# Patient Record
Sex: MALE | Race: OTHER | Employment: OTHER | URBAN - METROPOLITAN AREA
[De-identification: names, ages, dates, MRNs, and addresses within clinical notes are randomized per-mention and may not be internally consistent; named-entity substitution may affect disease eponyms.]

---

## 2023-05-09 DIAGNOSIS — E03.9 HYPOTHYROIDISM, UNSPECIFIED: ICD-10-CM

## 2023-05-09 DIAGNOSIS — E11.69 TYPE 2 DIABETES MELLITUS WITH OTHER SPECIFIED COMPLICATION (MULTI): ICD-10-CM

## 2023-05-09 DIAGNOSIS — I25.810 ATHEROSCLEROSIS OF CORONARY ARTERY BYPASS GRAFT(S) WITHOUT ANGINA PECTORIS: ICD-10-CM

## 2023-05-09 DIAGNOSIS — E78.2 MIXED HYPERLIPIDEMIA: ICD-10-CM

## 2023-05-10 DIAGNOSIS — E11.42 CONTROLLED TYPE 2 DIABETES MELLITUS WITH DIABETIC POLYNEUROPATHY, WITH LONG-TERM CURRENT USE OF INSULIN (MULTI): Primary | ICD-10-CM

## 2023-05-10 DIAGNOSIS — Z79.4 CONTROLLED TYPE 2 DIABETES MELLITUS WITH DIABETIC POLYNEUROPATHY, WITH LONG-TERM CURRENT USE OF INSULIN (MULTI): Primary | ICD-10-CM

## 2023-05-10 RX ORDER — LEVOTHYROXINE SODIUM 50 UG/1
TABLET ORAL
Qty: 90 TABLET | Refills: 2 | Status: SHIPPED | OUTPATIENT
Start: 2023-05-10 | End: 2024-02-19

## 2023-05-10 RX ORDER — SIMVASTATIN 20 MG/1
TABLET, FILM COATED ORAL
Qty: 90 TABLET | Refills: 2 | Status: SHIPPED | OUTPATIENT
Start: 2023-05-10 | End: 2024-01-15

## 2023-05-10 RX ORDER — CLOPIDOGREL BISULFATE 75 MG/1
TABLET ORAL
Qty: 90 TABLET | Refills: 2 | Status: SHIPPED | OUTPATIENT
Start: 2023-05-10 | End: 2024-02-19

## 2023-05-10 RX ORDER — INSULIN LISPRO 100 [IU]/ML
INJECTION, SOLUTION INTRAVENOUS; SUBCUTANEOUS
COMMUNITY
End: 2023-05-10 | Stop reason: SDUPTHER

## 2023-05-10 RX ORDER — METOPROLOL SUCCINATE 50 MG/1
TABLET, EXTENDED RELEASE ORAL
Qty: 90 TABLET | Refills: 2 | Status: SHIPPED | OUTPATIENT
Start: 2023-05-10 | End: 2024-02-19

## 2023-05-11 RX ORDER — INSULIN LISPRO 100 [IU]/ML
INJECTION, SOLUTION INTRAVENOUS; SUBCUTANEOUS
Qty: 1 EACH | Refills: 1 | Status: SHIPPED | OUTPATIENT
Start: 2023-05-11 | End: 2023-11-07 | Stop reason: SDUPTHER

## 2023-07-07 ENCOUNTER — OFFICE VISIT (OUTPATIENT)
Dept: PRIMARY CARE | Facility: CLINIC | Age: 63
End: 2023-07-07
Payer: MEDICAID

## 2023-07-07 VITALS
WEIGHT: 203 LBS | BODY MASS INDEX: 29.06 KG/M2 | DIASTOLIC BLOOD PRESSURE: 80 MMHG | HEIGHT: 70 IN | TEMPERATURE: 97 F | OXYGEN SATURATION: 99 % | SYSTOLIC BLOOD PRESSURE: 162 MMHG | HEART RATE: 65 BPM

## 2023-07-07 DIAGNOSIS — E11.69 COMBINED HYPERLIPIDEMIA ASSOCIATED WITH TYPE 2 DIABETES MELLITUS (MULTI): ICD-10-CM

## 2023-07-07 DIAGNOSIS — E78.2 COMBINED HYPERLIPIDEMIA ASSOCIATED WITH TYPE 2 DIABETES MELLITUS (MULTI): ICD-10-CM

## 2023-07-07 DIAGNOSIS — E03.9 HYPOTHYROIDISM, UNSPECIFIED TYPE: ICD-10-CM

## 2023-07-07 DIAGNOSIS — I15.2 HYPERTENSION ASSOCIATED WITH DIABETES (MULTI): ICD-10-CM

## 2023-07-07 DIAGNOSIS — E11.3299 MILD NONPROLIFERATIVE DIABETIC RETINOPATHY WITHOUT MACULAR EDEMA ASSOCIATED WITH TYPE 2 DIABETES MELLITUS, UNSPECIFIED LATERALITY (MULTI): Primary | ICD-10-CM

## 2023-07-07 DIAGNOSIS — Z12.11 ENCOUNTER FOR SCREENING FOR MALIGNANT NEOPLASM OF COLON: ICD-10-CM

## 2023-07-07 DIAGNOSIS — E11.59 HYPERTENSION ASSOCIATED WITH DIABETES (MULTI): ICD-10-CM

## 2023-07-07 DIAGNOSIS — E11.42 DIABETIC POLYNEUROPATHY ASSOCIATED WITH TYPE 2 DIABETES MELLITUS (MULTI): ICD-10-CM

## 2023-07-07 PROBLEM — E11.319 DIABETIC RETINOPATHY (MULTI): Status: ACTIVE | Noted: 2023-07-07

## 2023-07-07 PROBLEM — I25.810 CORONARY ARTERY DISEASE INVOLVING CORONARY BYPASS GRAFT OF NATIVE HEART: Status: ACTIVE | Noted: 2023-07-07

## 2023-07-07 PROBLEM — E11.40 DIABETIC NEUROPATHY (MULTI): Status: ACTIVE | Noted: 2023-07-07

## 2023-07-07 PROBLEM — E55.9 VITAMIN D DEFICIENCY: Status: ACTIVE | Noted: 2023-07-07

## 2023-07-07 PROBLEM — N18.31 STAGE 3A CHRONIC KIDNEY DISEASE (MULTI): Status: ACTIVE | Noted: 2023-07-07

## 2023-07-07 LAB — POC HEMOGLOBIN A1C: 6.7 % (ref 4.2–6.5)

## 2023-07-07 PROCEDURE — 3079F DIAST BP 80-89 MM HG: CPT | Performed by: FAMILY MEDICINE

## 2023-07-07 PROCEDURE — 83036 HEMOGLOBIN GLYCOSYLATED A1C: CPT | Performed by: FAMILY MEDICINE

## 2023-07-07 PROCEDURE — 99214 OFFICE O/P EST MOD 30 MIN: CPT | Performed by: FAMILY MEDICINE

## 2023-07-07 PROCEDURE — 1036F TOBACCO NON-USER: CPT | Performed by: FAMILY MEDICINE

## 2023-07-07 PROCEDURE — 3077F SYST BP >= 140 MM HG: CPT | Performed by: FAMILY MEDICINE

## 2023-07-07 RX ORDER — FENOFIBRATE 160 MG/1
80 TABLET ORAL DAILY
COMMUNITY
End: 2023-08-29

## 2023-07-07 RX ORDER — ASPIRIN 81 MG/1
TABLET ORAL
COMMUNITY
Start: 2011-01-21

## 2023-07-07 RX ORDER — ERGOCALCIFEROL 1.25 MG/1
50000 CAPSULE ORAL WEEKLY
COMMUNITY
End: 2023-11-07 | Stop reason: ALTCHOICE

## 2023-07-07 RX ORDER — INSULIN GLARGINE 300 U/ML
INJECTION, SOLUTION SUBCUTANEOUS
COMMUNITY
Start: 2019-02-20 | End: 2023-09-01

## 2023-07-07 ASSESSMENT — LIFESTYLE VARIABLES
HOW OFTEN DO YOU HAVE A DRINK CONTAINING ALCOHOL: MONTHLY OR LESS
AUDIT-C TOTAL SCORE: 1
HOW MANY STANDARD DRINKS CONTAINING ALCOHOL DO YOU HAVE ON A TYPICAL DAY: PATIENT DOES NOT DRINK
SKIP TO QUESTIONS 9-10: 1
HOW OFTEN DO YOU HAVE SIX OR MORE DRINKS ON ONE OCCASION: NEVER

## 2023-07-07 NOTE — PROGRESS NOTES
Subjective   Patient ID: Charito Rojas is a 62 y.o. male who presents for Medicare Annual Wellness Visit Subsequent.    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 (CMS/HCC)    Diabetic neuropathy (CMS/HCC)    Relevant Orders    POCT glycosylated hemoglobin (Hb A1C) manually resulted    Diabetic retinopathy (CMS/HCC) - Primary    Hypertension associated with diabetes (CMS/[facility])    Hypothyroidism     Other Visit Diagnoses       Encounter for screening for malignant neoplasm of colon        Relevant Orders    Cologuard® colon cancer screening        Last wellness in October 2022   Prevnar 20 on previous study  Continue follow-up with podiatry  Continue to follow-up with ophthalmology    Advised to cut down on insulin previous visit  Diet exercise  cologuard negative June 2020 and had a colonoscopy 4 years ago  Following with podiatry  Received Pneumovax  Discussed about diet exercise  Following up with ophthalmology with diabetic retinopathy every 6 months  Consider gabapentin for diabetic neuropathy patient stopped  Follow-up in 6 months     HPI  62-year-old male here for follow-up on    Globin A1c was 6.4 on previous visit 6.7 today      Hypertension.   Hyperlipidemia.  Uncontrolled diabetes with peripheral neuropathy fairly controlled without medication diabetic retinopathy  Hypothyroidism  Obesity.   Coronary artery disease, three-vessel bypass surgery February 2011 without active symptoms. Maintained on appropriate medications and dual antiplatelet agents    bilateral feet tingling numbness pins and needles secondary to diabetic neuropathy  No hypoglycemia no new signs and symptoms  Patient received flu vaccine       Discussed about getting shingles vaccine  Walking 5 miles almost every day not walking that much secondary to ulcer in the leg which is healing well was seen by podiatry    No Known Allergies    Current Outpatient  "Medications   Medication Sig Dispense Refill    aspirin 81 mg EC tablet 1 tab(s), ORAL, DAILY, 90 tab(s), Tablet EC      clopidogrel (Plavix) 75 mg tablet TAKE 1 TABLET BY MOUTH EVERY DAY 90 tablet 2    ergocalciferol (Vitamin D-2) 1.25 MG (12936 UT) capsule Take 1 capsule (50,000 Units) by mouth once a week.      fenofibrate (Triglide) 160 mg tablet Take 0.5 tablets (80 mg) by mouth once daily.      insulin lispro (HumaLOG) 100 unit/mL injection INJECT 14 UNITS SUBQUTANIOUSLY 3 TIMES DAILY 1 each 1    levothyroxine (Synthroid, Levoxyl) 50 mcg tablet TAKE 1 TABLET BY MOUTH EVERY DAY 90 tablet 2    metoprolol succinate XL (Toprol-XL) 50 mg 24 hr tablet TAKE 1 TABLET BY MOUTH EVERY DAY 90 tablet 2    simvastatin (Zocor) 20 mg tablet TAKE 1 TABLET BY MOUTH EVERY DAY 90 tablet 2    Toujeo SoloStar U-300 Insulin 300 unit/mL (1.5 mL) injection Inject under the skin.       No current facility-administered medications for this visit.       Objective   Visit Vitals  /80 (BP Location: Left arm, Patient Position: Sitting)   Pulse 65   Temp 36.1 °C (97 °F)   Ht 1.778 m (5' 10\")   Wt 92.1 kg (203 lb)   SpO2 99%   BMI 29.13 kg/m²   Smoking Status Never   BSA 2.13 m²     B/l le foot numbness chronic   Pilse intact  Left toes deformity  Skin normal  Constitutional:       General: He is not in acute distress.     Appearance: Normal appearance.   HENT:      Head: Normocephalic and atraumatic.      Nose: Nose normal.   Eyes:      Extraocular Movements: Extraocular movements intact.      Conjunctiva/sclera: Conjunctivae normal.   Cardiovascular:      Rate and Rhythm: Normal rate and regular rhythm.   Pulmonary:      Effort: Pulmonary effort is normal.      Breath sounds: Normal breath sounds.   Skin:     General: Skin is warm.   Neurological:      Mental Status: He is alert and oriented to person, place, and time.   Psychiatric:         Mood and Affect: Mood normal.         Behavior: Behavior normal.     There is no immunization " history on file for this patient.    Review of Systems    No visits with results within 4 Month(s) from this visit.   Latest known visit with results is:   Legacy Encounter on 10/10/2022   Component Date Value Ref Range Status    Prostate Specific Antigen,Screen 10/10/2022 0.58  0.00 - 4.00 ng/mL Final    Cholesterol 10/10/2022 128  0 - 199 mg/dL Final    HDL 10/10/2022 37.6 (A)  mg/dL Final    Cholesterol/HDL Ratio 10/10/2022 3.4   Final    LDL 10/10/2022 75  0 - 99 mg/dL Final    VLDL 10/10/2022 15  0 - 40 mg/dL Final    Triglycerides 10/10/2022 75  0 - 149 mg/dL Final    Glucose 10/10/2022 136 (H)  74 - 99 mg/dL Final    Sodium 10/10/2022 139  136 - 145 mmol/L Final    Potassium 10/10/2022 4.5  3.5 - 5.3 mmol/L Final    Chloride 10/10/2022 103  98 - 107 mmol/L Final    Bicarbonate 10/10/2022 28  21 - 32 mmol/L Final    Anion Gap 10/10/2022 13  10 - 20 mmol/L Final    Urea Nitrogen 10/10/2022 19  6 - 23 mg/dL Final    Creatinine 10/10/2022 1.09  0.50 - 1.30 mg/dL Final    GFR MALE 10/10/2022 77  >90 mL/min/1.73m2 Final    Calcium 10/10/2022 10.0  8.6 - 10.6 mg/dL Final    Albumin 10/10/2022 4.2  3.4 - 5.0 g/dL Final    Alkaline Phosphatase 10/10/2022 40  33 - 136 U/L Final    Total Protein 10/10/2022 7.3  6.4 - 8.2 g/dL Final    AST 10/10/2022 16  9 - 39 U/L Final    Total Bilirubin 10/10/2022 0.4  0.0 - 1.2 mg/dL Final    ALT (SGPT) 10/10/2022 16  10 - 52 U/L Final    Hemoglobin A1C 10/10/2022 6.5 (A)  % Final    Estimated Average Glucose 10/10/2022 140  MG/DL Final    TSH 10/10/2022 3.84  0.44 - 3.98 mIU/L Final    WBC 10/10/2022 4.8  4.4 - 11.3 x10E9/L Final    nRBC 10/10/2022 0.0  0.0 - 0.0 /100 WBC Final    RBC 10/10/2022 4.56  4.50 - 5.90 x10E12/L Final    Hemoglobin 10/10/2022 13.2 (L)  13.5 - 17.5 g/dL Final    Hematocrit 10/10/2022 38.6 (L)  41.0 - 52.0 % Final    MCV 10/10/2022 85  80 - 100 fL Final    MCHC 10/10/2022 34.2  32.0 - 36.0 g/dL Final    Platelets 10/10/2022 203  150 - 450 x10E9/L Final     RDW 10/10/2022 12.6  11.5 - 14.5 % Final    ALBUMIN (MG/L) IN URINE 10/10/2022 11.3  Not Established mg/L Final    Albumin/Creatine Ratio 10/10/2022 13.6  0.0 - 30.0 ug/mg crt Final    Creatinine, Urine 10/10/2022 83.1  20.0 - 370.0 mg/dL Final    Color, Urine 10/10/2022 YELLOW  STRAW,YELLOW Final    Appearance, Urine 10/10/2022 CLEAR  CLEAR Final    Specific Gravity, Urine 10/10/2022 1.015  1.005 - 1.035 Final    pH, Urine 10/10/2022 7.0  5.0 - 8.0 Final    Protein, Urine 10/10/2022 NEGATIVE  NEGATIVE mg/dL Final    Glucose, Urine 10/10/2022 NEGATIVE  NEGATIVE mg/dL Final    Blood, Urine 10/10/2022 NEGATIVE  NEGATIVE Final    Ketones, Urine 10/10/2022 NEGATIVE  NEGATIVE mg/dL Final    Bilirubin, Urine 10/10/2022 NEGATIVE  NEGATIVE Final    Urobilinogen, Urine 10/10/2022 <2.0  0.0 - 1.9 mg/dL Final    Nitrite, Urine 10/10/2022 NEGATIVE  NEGATIVE Final    Leukocyte Esterase, Urine 10/10/2022 NEGATIVE  NEGATIVE Final    Vitamin B-12 10/10/2022 307  211 - 911 pg/mL Final       Radiology: Reviewed imaging in powerchart.  No results found.    No family history on file.  Social History     Socioeconomic History    Marital status: Unknown     Spouse name: None    Number of children: None    Years of education: None    Highest education level: None   Occupational History    None   Tobacco Use    Smoking status: Never    Smokeless tobacco: Never   Substance and Sexual Activity    Alcohol use: Never    Drug use: Never    Sexual activity: None   Other Topics Concern    None   Social History Narrative    None     Social Determinants of Health     Financial Resource Strain: Not on file   Food Insecurity: Not on file   Transportation Needs: Not on file   Physical Activity: Not on file   Stress: Not on file   Social Connections: Not on file   Intimate Partner Violence: Not on file   Housing Stability: Not on file     Past Medical History:   Diagnosis Date    Atherosclerosis of coronary artery bypass graft(s) without angina  pectoris 06/16/2018    Coronary artery disease involving coronary bypass graft of native heart    Other specified postprocedural states 07/09/2017    History of colonoscopy    Personal history of other diseases of the circulatory system 01/16/2018    History of cardiac disorder    Radiculopathy, lumbar region 02/11/2016    Chronic radicular low back pain     Past Surgical History:   Procedure Laterality Date    MOUTH SURGERY  01/08/2015    Oral Surgery Tooth Extraction    OTHER SURGICAL HISTORY  02/20/2019    Cataract surgery    OTHER SURGICAL HISTORY  09/18/2019    Eye surgery       Charting was completed using voice recognition technology and may include unintended errors.

## 2023-07-29 LAB — NONINV COLON CA DNA+OCC BLD SCRN STL QL: NEGATIVE

## 2023-08-25 DIAGNOSIS — E78.2 MIXED HYPERLIPIDEMIA: ICD-10-CM

## 2023-08-25 DIAGNOSIS — E11.69 TYPE 2 DIABETES MELLITUS WITH OTHER SPECIFIED COMPLICATION (MULTI): ICD-10-CM

## 2023-08-29 DIAGNOSIS — E11.59 HYPERTENSION ASSOCIATED WITH DIABETES (MULTI): ICD-10-CM

## 2023-08-29 DIAGNOSIS — I15.2 HYPERTENSION ASSOCIATED WITH DIABETES (MULTI): ICD-10-CM

## 2023-08-29 RX ORDER — FENOFIBRATE 160 MG/1
80 TABLET ORAL DAILY
Qty: 45 TABLET | Refills: 2 | Status: SHIPPED | OUTPATIENT
Start: 2023-08-29 | End: 2024-05-17 | Stop reason: SDUPTHER

## 2023-09-01 RX ORDER — INSULIN GLARGINE 300 U/ML
INJECTION, SOLUTION SUBCUTANEOUS
Qty: 4.5 ML | Refills: 3 | Status: SHIPPED | OUTPATIENT
Start: 2023-09-01 | End: 2023-11-07 | Stop reason: SDUPTHER

## 2023-11-07 ENCOUNTER — LAB (OUTPATIENT)
Dept: LAB | Facility: LAB | Age: 63
End: 2023-11-07
Payer: COMMERCIAL

## 2023-11-07 ENCOUNTER — OFFICE VISIT (OUTPATIENT)
Dept: PRIMARY CARE | Facility: CLINIC | Age: 63
End: 2023-11-07
Payer: COMMERCIAL

## 2023-11-07 VITALS
DIASTOLIC BLOOD PRESSURE: 78 MMHG | SYSTOLIC BLOOD PRESSURE: 130 MMHG | BODY MASS INDEX: 27.92 KG/M2 | WEIGHT: 195 LBS | TEMPERATURE: 96.8 F | HEART RATE: 60 BPM | HEIGHT: 70 IN | OXYGEN SATURATION: 98 %

## 2023-11-07 DIAGNOSIS — Z00.00 VISIT FOR PREVENTIVE HEALTH EXAMINATION: ICD-10-CM

## 2023-11-07 DIAGNOSIS — E11.59 HYPERTENSION ASSOCIATED WITH DIABETES (MULTI): ICD-10-CM

## 2023-11-07 DIAGNOSIS — Z12.5 ENCOUNTER FOR SCREENING FOR MALIGNANT NEOPLASM OF PROSTATE: ICD-10-CM

## 2023-11-07 DIAGNOSIS — E11.42 CONTROLLED TYPE 2 DIABETES MELLITUS WITH DIABETIC POLYNEUROPATHY, WITH LONG-TERM CURRENT USE OF INSULIN (MULTI): ICD-10-CM

## 2023-11-07 DIAGNOSIS — E11.69 COMBINED HYPERLIPIDEMIA ASSOCIATED WITH TYPE 2 DIABETES MELLITUS (MULTI): ICD-10-CM

## 2023-11-07 DIAGNOSIS — Z79.4 CONTROLLED TYPE 2 DIABETES MELLITUS WITH DIABETIC POLYNEUROPATHY, WITH LONG-TERM CURRENT USE OF INSULIN (MULTI): ICD-10-CM

## 2023-11-07 DIAGNOSIS — I15.2 HYPERTENSION ASSOCIATED WITH DIABETES (MULTI): ICD-10-CM

## 2023-11-07 DIAGNOSIS — N18.31 STAGE 3A CHRONIC KIDNEY DISEASE (MULTI): ICD-10-CM

## 2023-11-07 DIAGNOSIS — E11.3299 MILD NONPROLIFERATIVE DIABETIC RETINOPATHY WITHOUT MACULAR EDEMA ASSOCIATED WITH TYPE 2 DIABETES MELLITUS, UNSPECIFIED LATERALITY (MULTI): ICD-10-CM

## 2023-11-07 DIAGNOSIS — Z00.00 VISIT FOR PREVENTIVE HEALTH EXAMINATION: Primary | ICD-10-CM

## 2023-11-07 DIAGNOSIS — E78.2 COMBINED HYPERLIPIDEMIA ASSOCIATED WITH TYPE 2 DIABETES MELLITUS (MULTI): ICD-10-CM

## 2023-11-07 LAB — POC HEMOGLOBIN A1C: 6 % (ref 4.2–6.5)

## 2023-11-07 PROCEDURE — 80061 LIPID PANEL: CPT

## 2023-11-07 PROCEDURE — 82570 ASSAY OF URINE CREATININE: CPT

## 2023-11-07 PROCEDURE — 3075F SYST BP GE 130 - 139MM HG: CPT | Performed by: FAMILY MEDICINE

## 2023-11-07 PROCEDURE — 83036 HEMOGLOBIN GLYCOSYLATED A1C: CPT | Performed by: FAMILY MEDICINE

## 2023-11-07 PROCEDURE — 82043 UR ALBUMIN QUANTITATIVE: CPT

## 2023-11-07 PROCEDURE — 1036F TOBACCO NON-USER: CPT | Performed by: FAMILY MEDICINE

## 2023-11-07 PROCEDURE — 84443 ASSAY THYROID STIM HORMONE: CPT

## 2023-11-07 PROCEDURE — 84153 ASSAY OF PSA TOTAL: CPT

## 2023-11-07 PROCEDURE — 99214 OFFICE O/P EST MOD 30 MIN: CPT | Performed by: FAMILY MEDICINE

## 2023-11-07 PROCEDURE — 80053 COMPREHEN METABOLIC PANEL: CPT

## 2023-11-07 PROCEDURE — 85027 COMPLETE CBC AUTOMATED: CPT

## 2023-11-07 PROCEDURE — 3078F DIAST BP <80 MM HG: CPT | Performed by: FAMILY MEDICINE

## 2023-11-07 PROCEDURE — 36415 COLL VENOUS BLD VENIPUNCTURE: CPT

## 2023-11-07 PROCEDURE — 81001 URINALYSIS AUTO W/SCOPE: CPT

## 2023-11-07 RX ORDER — INSULIN GLARGINE 300 U/ML
15 INJECTION, SOLUTION SUBCUTANEOUS NIGHTLY
Qty: 4.5 ML | Refills: 3 | Status: SHIPPED | OUTPATIENT
Start: 2023-11-07

## 2023-11-07 RX ORDER — INSULIN LISPRO 100 [IU]/ML
INJECTION, SOLUTION INTRAVENOUS; SUBCUTANEOUS
Qty: 1 EACH | Refills: 1 | Status: SHIPPED | OUTPATIENT
Start: 2023-11-07 | End: 2024-02-06

## 2023-11-07 NOTE — PROGRESS NOTES
Subjective   Patient ID: Charito Rojas is a 63 y.o. male who presents for follow-up on chronic medical condition.    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 (CMS/HCC)    Diabetic retinopathy (CMS/HCC)    Hypertension associated with diabetes (CMS/HCC)    Relevant Medications    insulin glargine (Toujeo SoloStar U-300 Insulin) 300 unit/mL (1.5 mL) injection    Other Relevant Orders    TSH with reflex to Free T4 if abnormal    Lipid Panel    Comprehensive Metabolic Panel    CBC    Urinalysis with Reflex Microscopic    Albumin , Urine Random    Stage 3a chronic kidney disease (CMS/HCC)     Other Visit Diagnoses       Visit for preventive health examination    -  Primary    Relevant Orders    POCT glycosylated hemoglobin (Hb A1C) manually resulted    Prostate Specific Antigen, Screen    TSH with reflex to Free T4 if abnormal    Lipid Panel    Comprehensive Metabolic Panel    CBC    Urinalysis with Reflex Microscopic    Albumin , Urine Random    Controlled type 2 diabetes mellitus with diabetic polyneuropathy, with long-term current use of insulin (CMS/[facility])        Relevant Medications    insulin glargine (Toujeo SoloStar U-300 Insulin) 300 unit/mL (1.5 mL) injection    insulin lispro (HumaLOG) 100 unit/mL injection    Other Relevant Orders    POCT glycosylated hemoglobin (Hb A1C) manually resulted    TSH with reflex to Free T4 if abnormal    Lipid Panel    Comprehensive Metabolic Panel    CBC    Urinalysis with Reflex Microscopic    Albumin , Urine Random    Encounter for screening for malignant neoplasm of prostate        Relevant Orders    TSH with reflex to Free T4 if abnormal          Prevnar 20 on previous study  Continue follow-up with podiatry  Continue to follow-up with ophthalmology    Received flu vaccine    Diet exercise  cologuard negative June 2020 and had a colonoscopy 4 years ago  Cologuard negative in July  2023  Following with podiatry  Received Pneumovax  Discussed about diet exercise  Following up with ophthalmology with diabetic retinopathy every 6 months  Consider gabapentin for diabetic neuropathy patient stopped  Follow-up in 6 months     Wellness on next appointment    HPI  63-year-old male here for follow-up on    HbA1c was 6.4 --> 6.7-->6.0      Hypertension.   Hyperlipidemia.  Uncontrolled diabetes with peripheral neuropathy fairly controlled without medication diabetic retinopathy  Hypothyroidism  Obesity.   Coronary artery disease, three-vessel bypass surgery February 2011 without active symptoms. Maintained on appropriate medications and dual antiplatelet agents    bilateral feet tingling numbness pins and needles secondary to diabetic neuropathy  No hypoglycemia no new signs and symptoms  Patient received flu vaccine       Discussed about getting shingles vaccine  Walking 5 miles almost every day not walking that much secondary to ulcer in the leg which is healing well was seen by podiatry    Allergies   Allergen Reactions    Simvastatin Other and Unknown     lft changes       Current Outpatient Medications   Medication Sig Dispense Refill    aspirin 81 mg EC tablet 1 tab(s), ORAL, DAILY, 90 tab(s), Tablet EC      clopidogrel (Plavix) 75 mg tablet TAKE 1 TABLET BY MOUTH EVERY DAY 90 tablet 2    fenofibrate (Triglide) 160 mg tablet Take 0.5 tablets (80 mg) by mouth once daily. 45 tablet 2    levothyroxine (Synthroid, Levoxyl) 50 mcg tablet TAKE 1 TABLET BY MOUTH EVERY DAY 90 tablet 2    metoprolol succinate XL (Toprol-XL) 50 mg 24 hr tablet TAKE 1 TABLET BY MOUTH EVERY DAY 90 tablet 2    simvastatin (Zocor) 20 mg tablet TAKE 1 TABLET BY MOUTH EVERY DAY 90 tablet 2    insulin glargine (Toujeo SoloStar U-300 Insulin) 300 unit/mL (1.5 mL) injection Inject 15 Units under the skin once daily at bedtime. Take as directed per insulin instructions. 4.5 mL 3    insulin lispro (HumaLOG) 100 unit/mL injection INJECT  "14 UNITS SUBQUTANIOUSLY 3 TIMES DAILY 1 each 1     No current facility-administered medications for this visit.       Objective   Visit Vitals  /78 (BP Location: Left arm, Patient Position: Sitting)   Pulse 60   Temp 36 °C (96.8 °F)   Ht 1.778 m (5' 10\")   Wt 88.5 kg (195 lb)   SpO2 98%   BMI 27.98 kg/m²   Smoking Status Never   BSA 2.09 m²     B/l le foot numbness chronic   Pilse intact  Left toes deformity  Skin normal  Constitutional:       General: He is not in acute distress.     Appearance: Normal appearance.   HENT:      Head: Normocephalic and atraumatic.      Nose: Nose normal.   Eyes:      Extraocular Movements: Extraocular movements intact.      Conjunctiva/sclera: Conjunctivae normal.   Cardiovascular:      Rate and Rhythm: Normal rate and regular rhythm.   Pulmonary:      Effort: Pulmonary effort is normal.      Breath sounds: Normal breath sounds.   Skin:     General: Skin is warm.   Neurological:      Mental Status: He is alert and oriented to person, place, and time.   Psychiatric:         Mood and Affect: Mood normal.         Behavior: Behavior normal.   Immunization History   Administered Date(s) Administered    Influenza, injectable, MDCK, preservative free, quadrivalent 09/07/2017    Influenza, seasonal, injectable, preservative free 09/01/2014       Review of Systems    No visits with results within 4 Month(s) from this visit.   Latest known visit with results is:   Office Visit on 07/07/2023   Component Date Value Ref Range Status    POC HEMOGLOBIN A1c 07/07/2023 6.7 (A)  4.2 - 6.5 % Final    NONINV COLON CA DNA+OCC BLD SCRN S* 07/22/2023 Negative  Negative Final       Radiology: Reviewed imaging in powerchart.  No results found.    No family history on file.  Social History     Socioeconomic History    Marital status: Unknown     Spouse name: None    Number of children: None    Years of education: None    Highest education level: None   Occupational History    None   Tobacco Use    " Smoking status: Never    Smokeless tobacco: Never   Substance and Sexual Activity    Alcohol use: Never    Drug use: Never    Sexual activity: None   Other Topics Concern    None   Social History Narrative    None     Social Determinants of Health     Financial Resource Strain: Not on file   Food Insecurity: Not on file   Transportation Needs: Not on file   Physical Activity: Not on file   Stress: Not on file   Social Connections: Not on file   Intimate Partner Violence: Not on file   Housing Stability: Not on file     Past Medical History:   Diagnosis Date    Atherosclerosis of coronary artery bypass graft(s) without angina pectoris 06/16/2018    Coronary artery disease involving coronary bypass graft of native heart    Other specified postprocedural states 07/09/2017    History of colonoscopy    Personal history of other diseases of the circulatory system 01/16/2018    History of cardiac disorder    Radiculopathy, lumbar region 02/11/2016    Chronic radicular low back pain     Past Surgical History:   Procedure Laterality Date    MOUTH SURGERY  01/08/2015    Oral Surgery Tooth Extraction    OTHER SURGICAL HISTORY  02/20/2019    Cataract surgery    OTHER SURGICAL HISTORY  09/18/2019    Eye surgery       Charting was completed using voice recognition technology and may include unintended errors.       Physical Exam

## 2023-11-08 ENCOUNTER — TELEPHONE (OUTPATIENT)
Dept: PRIMARY CARE | Facility: CLINIC | Age: 63
End: 2023-11-08

## 2023-11-08 ENCOUNTER — OFFICE VISIT (OUTPATIENT)
Dept: PRIMARY CARE | Facility: CLINIC | Age: 63
End: 2023-11-08
Payer: COMMERCIAL

## 2023-11-08 VITALS
HEART RATE: 60 BPM | OXYGEN SATURATION: 98 % | SYSTOLIC BLOOD PRESSURE: 166 MMHG | WEIGHT: 195 LBS | BODY MASS INDEX: 27.92 KG/M2 | HEIGHT: 70 IN | DIASTOLIC BLOOD PRESSURE: 88 MMHG

## 2023-11-08 DIAGNOSIS — D64.9 ANEMIA, UNSPECIFIED TYPE: Primary | ICD-10-CM

## 2023-11-08 DIAGNOSIS — N17.9 ACUTE RENAL FAILURE, UNSPECIFIED ACUTE RENAL FAILURE TYPE (CMS-HCC): ICD-10-CM

## 2023-11-08 DIAGNOSIS — R52 PAIN: ICD-10-CM

## 2023-11-08 LAB
ALBUMIN SERPL BCP-MCNC: 4.6 G/DL (ref 3.4–5)
ALP SERPL-CCNC: 32 U/L (ref 33–136)
ALT SERPL W P-5'-P-CCNC: 15 U/L (ref 10–52)
ANION GAP SERPL CALC-SCNC: 12 MMOL/L (ref 10–20)
APPEARANCE UR: CLEAR
AST SERPL W P-5'-P-CCNC: 20 U/L (ref 9–39)
BILIRUB SERPL-MCNC: 0.4 MG/DL (ref 0–1.2)
BILIRUB UR STRIP.AUTO-MCNC: NEGATIVE MG/DL
BUN SERPL-MCNC: 22 MG/DL (ref 6–23)
CALCIUM SERPL-MCNC: 10.1 MG/DL (ref 8.6–10.6)
CHLORIDE SERPL-SCNC: 103 MMOL/L (ref 98–107)
CHOLEST SERPL-MCNC: 112 MG/DL (ref 0–199)
CHOLESTEROL/HDL RATIO: 2.3
CO2 SERPL-SCNC: 29 MMOL/L (ref 21–32)
COLOR UR: YELLOW
CREAT SERPL-MCNC: 1.64 MG/DL (ref 0.5–1.3)
CREAT UR-MCNC: 104.7 MG/DL (ref 20–370)
ERYTHROCYTE [DISTWIDTH] IN BLOOD BY AUTOMATED COUNT: 13 % (ref 11.5–14.5)
GFR SERPL CREATININE-BSD FRML MDRD: 47 ML/MIN/1.73M*2
GLUCOSE SERPL-MCNC: 113 MG/DL (ref 74–99)
GLUCOSE UR STRIP.AUTO-MCNC: NEGATIVE MG/DL
HCT VFR BLD AUTO: 37.1 % (ref 41–52)
HDLC SERPL-MCNC: 48.2 MG/DL
HGB BLD-MCNC: 11.8 G/DL (ref 13.5–17.5)
KETONES UR STRIP.AUTO-MCNC: NEGATIVE MG/DL
LDLC SERPL CALC-MCNC: 54 MG/DL
LEUKOCYTE ESTERASE UR QL STRIP.AUTO: NEGATIVE
MCH RBC QN AUTO: 28.4 PG (ref 26–34)
MCHC RBC AUTO-ENTMCNC: 31.8 G/DL (ref 32–36)
MCV RBC AUTO: 89 FL (ref 80–100)
MICROALBUMIN UR-MCNC: 34.8 MG/L
MICROALBUMIN/CREAT UR: 33.2 UG/MG CREAT
NITRITE UR QL STRIP.AUTO: NEGATIVE
NON HDL CHOLESTEROL: 64 MG/DL (ref 0–149)
NRBC BLD-RTO: 0 /100 WBCS (ref 0–0)
PH UR STRIP.AUTO: 5 [PH]
PLATELET # BLD AUTO: 226 X10*3/UL (ref 150–450)
POTASSIUM SERPL-SCNC: 4.5 MMOL/L (ref 3.5–5.3)
PROT SERPL-MCNC: 7.2 G/DL (ref 6.4–8.2)
PROT UR STRIP.AUTO-MCNC: NEGATIVE MG/DL
PSA SERPL-MCNC: 0.77 NG/ML
RBC # BLD AUTO: 4.16 X10*6/UL (ref 4.5–5.9)
RBC # UR STRIP.AUTO: ABNORMAL /UL
RBC #/AREA URNS AUTO: ABNORMAL /HPF
SODIUM SERPL-SCNC: 139 MMOL/L (ref 136–145)
SP GR UR STRIP.AUTO: 1.01
TRIGL SERPL-MCNC: 51 MG/DL (ref 0–149)
TSH SERPL-ACNC: 2.47 MIU/L (ref 0.44–3.98)
UROBILINOGEN UR STRIP.AUTO-MCNC: <2 MG/DL
VLDL: 10 MG/DL (ref 0–40)
WBC # BLD AUTO: 5.5 X10*3/UL (ref 4.4–11.3)
WBC #/AREA URNS AUTO: ABNORMAL /HPF

## 2023-11-08 PROCEDURE — 3060F POS MICROALBUMINURIA REV: CPT | Performed by: FAMILY MEDICINE

## 2023-11-08 PROCEDURE — 3048F LDL-C <100 MG/DL: CPT | Performed by: FAMILY MEDICINE

## 2023-11-08 PROCEDURE — 1036F TOBACCO NON-USER: CPT | Performed by: FAMILY MEDICINE

## 2023-11-08 PROCEDURE — 3079F DIAST BP 80-89 MM HG: CPT | Performed by: FAMILY MEDICINE

## 2023-11-08 PROCEDURE — 99214 OFFICE O/P EST MOD 30 MIN: CPT | Performed by: FAMILY MEDICINE

## 2023-11-08 PROCEDURE — 3077F SYST BP >= 140 MM HG: CPT | Performed by: FAMILY MEDICINE

## 2023-11-08 NOTE — PROGRESS NOTES
Subjective   Patient ID: Charito Rojas is a 63 y.o. male who presents for follow-up on chronic medical condition.    Assessment/Plan     Problem List Items Addressed This Visit    None  Visit Diagnoses       Anemia, unspecified type    -  Primary    Relevant Orders    Occult Blood, Stool    Occult Blood, Stool    Occult Blood, Stool    CT abdomen pelvis wo IV contrast    Acute renal failure, unspecified acute renal failure type (CMS/HCC)        Relevant Orders    CT abdomen pelvis wo IV contrast    Pain        Relevant Orders    CT abdomen pelvis wo IV contrast        Prevnar 20 on previous study  Continue follow-up with podiatry  Continue to follow-up with ophthalmology    Received flu vaccine    Diet exercise  cologuard negative June 2020 and had a colonoscopy 4 years ago  Cologuard negative in July 2023  Following with podiatry  Received Pneumovax  Discussed about diet exercise  Following up with ophthalmology with diabetic retinopathy every 6 months  Consider gabapentin for diabetic neuropathy patient stopped  Follow-up in 6 months     Wellness on next appointment    HPI  63-year-old male here for follow-up on    Abnormal lab work showed hematuria microscopic with elevated liver function and mild anemia  History of nephrolithiasis  We will consider CT abdomen pelvis without contrast consider FOBT x3    CBC BMP with iron studies on Friday and follow-up on Monday with me  Patient agrees  Avoid anti-inflammatory    Previous visit    HbA1c was 6.4 --> 6.7-->6.0      Hypertension.   Hyperlipidemia.  Uncontrolled diabetes with peripheral neuropathy fairly controlled without medication diabetic retinopathy  Hypothyroidism  Obesity.   Coronary artery disease, three-vessel bypass surgery February 2011 without active symptoms. Maintained on appropriate medications and dual antiplatelet agents    bilateral feet tingling numbness pins and needles secondary to diabetic neuropathy  No hypoglycemia no new signs and  "symptoms  Patient received flu vaccine       Discussed about getting shingles vaccine  Walking 5 miles almost every day not walking that much secondary to ulcer in the leg which is healing well was seen by podiatry    Allergies   Allergen Reactions    Simvastatin Other and Unknown     lft changes       Current Outpatient Medications   Medication Sig Dispense Refill    aspirin 81 mg EC tablet 1 tab(s), ORAL, DAILY, 90 tab(s), Tablet EC      clopidogrel (Plavix) 75 mg tablet TAKE 1 TABLET BY MOUTH EVERY DAY 90 tablet 2    fenofibrate (Triglide) 160 mg tablet Take 0.5 tablets (80 mg) by mouth once daily. 45 tablet 2    insulin glargine (Toujeo SoloStar U-300 Insulin) 300 unit/mL (1.5 mL) injection Inject 15 Units under the skin once daily at bedtime. Take as directed per insulin instructions. 4.5 mL 3    insulin lispro (HumaLOG) 100 unit/mL injection INJECT 14 UNITS SUBQUTANIOUSLY 3 TIMES DAILY 1 each 1    levothyroxine (Synthroid, Levoxyl) 50 mcg tablet TAKE 1 TABLET BY MOUTH EVERY DAY 90 tablet 2    metoprolol succinate XL (Toprol-XL) 50 mg 24 hr tablet TAKE 1 TABLET BY MOUTH EVERY DAY 90 tablet 2    simvastatin (Zocor) 20 mg tablet TAKE 1 TABLET BY MOUTH EVERY DAY 90 tablet 2     No current facility-administered medications for this visit.       Objective   Visit Vitals  /88   Pulse 60   Ht 1.778 m (5' 10\")   Wt 88.5 kg (195 lb)   SpO2 98%   BMI 27.98 kg/m²   Smoking Status Never   BSA 2.09 m²     B/l le foot numbness chronic   Pilse intact  Left toes deformity  Skin normal  Constitutional:       General: He is not in acute distress.     Appearance: Normal appearance.   HENT:      Head: Normocephalic and atraumatic.      Nose: Nose normal.   Eyes:      Extraocular Movements: Extraocular movements intact.      Conjunctiva/sclera: Conjunctivae normal.   Cardiovascular:      Rate and Rhythm: Normal rate and regular rhythm.   Pulmonary:      Effort: Pulmonary effort is normal.      Breath sounds: Normal breath " sounds.   Skin:     General: Skin is warm.   Neurological:      Mental Status: He is alert and oriented to person, place, and time.   Psychiatric:         Mood and Affect: Mood normal.         Behavior: Behavior normal.   Immunization History   Administered Date(s) Administered    Influenza, injectable, MDCK, preservative free, quadrivalent 09/07/2017    Influenza, seasonal, injectable, preservative free 09/01/2014       Review of Systems    Lab on 11/07/2023   Component Date Value Ref Range Status    Prostate Specific Antigen,Screen 11/07/2023 0.77  <=4.00 ng/mL Final    Thyroid Stimulating Hormone 11/07/2023 2.47  0.44 - 3.98 mIU/L Final    Cholesterol 11/07/2023 112  0 - 199 mg/dL Final    HDL-Cholesterol 11/07/2023 48.2  mg/dL Final    Cholesterol/HDL Ratio 11/07/2023 2.3   Final    LDL Calculated 11/07/2023 54  <=99 mg/dL Final    VLDL 11/07/2023 10  0 - 40 mg/dL Final    Triglycerides 11/07/2023 51  0 - 149 mg/dL Final    Non HDL Cholesterol 11/07/2023 64  0 - 149 mg/dL Final    Glucose 11/07/2023 113 (H)  74 - 99 mg/dL Final    Sodium 11/07/2023 139  136 - 145 mmol/L Final    Potassium 11/07/2023 4.5  3.5 - 5.3 mmol/L Final    Chloride 11/07/2023 103  98 - 107 mmol/L Final    Bicarbonate 11/07/2023 29  21 - 32 mmol/L Final    Anion Gap 11/07/2023 12  10 - 20 mmol/L Final    Urea Nitrogen 11/07/2023 22  6 - 23 mg/dL Final    Creatinine 11/07/2023 1.64 (H)  0.50 - 1.30 mg/dL Final    eGFR 11/07/2023 47 (L)  >60 mL/min/1.73m*2 Final    Calcium 11/07/2023 10.1  8.6 - 10.6 mg/dL Final    Albumin 11/07/2023 4.6  3.4 - 5.0 g/dL Final    Alkaline Phosphatase 11/07/2023 32 (L)  33 - 136 U/L Final    Total Protein 11/07/2023 7.2  6.4 - 8.2 g/dL Final    AST 11/07/2023 20  9 - 39 U/L Final    Bilirubin, Total 11/07/2023 0.4  0.0 - 1.2 mg/dL Final    ALT 11/07/2023 15  10 - 52 U/L Final    WBC 11/07/2023 5.5  4.4 - 11.3 x10*3/uL Final    nRBC 11/07/2023 0.0  0.0 - 0.0 /100 WBCs Final    RBC 11/07/2023 4.16 (L)  4.50 -  5.90 x10*6/uL Final    Hemoglobin 11/07/2023 11.8 (L)  13.5 - 17.5 g/dL Final    Hematocrit 11/07/2023 37.1 (L)  41.0 - 52.0 % Final    MCV 11/07/2023 89  80 - 100 fL Final    MCH 11/07/2023 28.4  26.0 - 34.0 pg Final    MCHC 11/07/2023 31.8 (L)  32.0 - 36.0 g/dL Final    RDW 11/07/2023 13.0  11.5 - 14.5 % Final    Platelets 11/07/2023 226  150 - 450 x10*3/uL Final    Color, Urine 11/07/2023 Yellow  Straw, Yellow Final    Appearance, Urine 11/07/2023 Clear  Clear Final    Specific Gravity, Urine 11/07/2023 1.014  1.005 - 1.035 Final    pH, Urine 11/07/2023 5.0  5.0, 5.5, 6.0, 6.5, 7.0, 7.5, 8.0 Final    Protein, Urine 11/07/2023 NEGATIVE  NEGATIVE mg/dL Final    Glucose, Urine 11/07/2023 NEGATIVE  NEGATIVE mg/dL Final    Blood, Urine 11/07/2023 LARGE (3+) (A)  NEGATIVE Final    Ketones, Urine 11/07/2023 NEGATIVE  NEGATIVE mg/dL Final    Bilirubin, Urine 11/07/2023 NEGATIVE  NEGATIVE Final    Urobilinogen, Urine 11/07/2023 <2.0  <2.0 mg/dL Final    Nitrite, Urine 11/07/2023 NEGATIVE  NEGATIVE Final    Leukocyte Esterase, Urine 11/07/2023 NEGATIVE  NEGATIVE Final    Albumin, Urine Random 11/07/2023 34.8  Not established mg/L Final    Creatinine, Urine Random 11/07/2023 104.7  20.0 - 370.0 mg/dL Final    Albumin/Creatine Ratio 11/07/2023 33.2 (H)  <30.0 ug/mg Creat Final    WBC, Urine 11/07/2023 NONE  1-5, NONE /HPF Final    RBC, Urine 11/07/2023 6-10 (A)  NONE, 1-2, 3-5 /HPF Final   Office Visit on 11/07/2023   Component Date Value Ref Range Status    POC HEMOGLOBIN A1c 11/07/2023 6.0  4.2 - 6.5 % Final       Radiology: Reviewed imaging in powerchart.  No results found.    No family history on file.  Social History     Socioeconomic History    Marital status: Unknown     Spouse name: None    Number of children: None    Years of education: None    Highest education level: None   Occupational History    None   Tobacco Use    Smoking status: Never    Smokeless tobacco: Never   Substance and Sexual Activity    Alcohol  use: Never    Drug use: Never    Sexual activity: None   Other Topics Concern    None   Social History Narrative    None     Social Determinants of Health     Financial Resource Strain: Not on file   Food Insecurity: Not on file   Transportation Needs: Not on file   Physical Activity: Not on file   Stress: Not on file   Social Connections: Not on file   Intimate Partner Violence: Not on file   Housing Stability: Not on file     Past Medical History:   Diagnosis Date    Atherosclerosis of coronary artery bypass graft(s) without angina pectoris 06/16/2018    Coronary artery disease involving coronary bypass graft of native heart    Other specified postprocedural states 07/09/2017    History of colonoscopy    Personal history of other diseases of the circulatory system 01/16/2018    History of cardiac disorder    Radiculopathy, lumbar region 02/11/2016    Chronic radicular low back pain     Past Surgical History:   Procedure Laterality Date    MOUTH SURGERY  01/08/2015    Oral Surgery Tooth Extraction    OTHER SURGICAL HISTORY  02/20/2019    Cataract surgery    OTHER SURGICAL HISTORY  09/18/2019    Eye surgery       Charting was completed using voice recognition technology and may include unintended errors.       Physical Exam

## 2023-11-08 NOTE — TELEPHONE ENCOUNTER
----- Message from Jass Roca MD sent at 11/8/2023  9:48 AM EST -----  Please call the patient regarding his abnormal result.  Advised patient to stop aspirin Plavix follow-up today patient can walk-in now as patient has a abnormal lab work

## 2023-11-10 LAB
NON-UH HIE BUN/CREAT RATIO: 13.5
NON-UH HIE BUN: 23 MG/DL (ref 9–23)
NON-UH HIE CALCIUM: 10.1 MG/DL (ref 8.7–10.4)
NON-UH HIE CALCULATED OSMOLALITY: 285 MOSM/KG (ref 275–295)
NON-UH HIE CHLORIDE: 106 MMOL/L (ref 98–107)
NON-UH HIE CO2, VENOUS: 27 MMOL/L (ref 20–31)
NON-UH HIE CREATININE: 1.7 MG/DL (ref 0.6–1.1)
NON-UH HIE FERRITIN: 143 NG/ML (ref 22–322)
NON-UH HIE GFR AA: 50
NON-UH HIE GLOMERULAR FILTRATION RATE: 41 ML/MIN/1.73M?
NON-UH HIE GLUCOSE: 99 MG/DL (ref 74–106)
NON-UH HIE HCT: 35.1 % (ref 41–52)
NON-UH HIE HGB: 11.9 G/DL (ref 13.5–17.5)
NON-UH HIE INSTR WBC ND: 4.7
NON-UH HIE IRON: 91 UG/DL (ref 65–175)
NON-UH HIE K: 4.8 MMOL/L (ref 3.5–5.1)
NON-UH HIE MCH: 29 PG (ref 27–34)
NON-UH HIE MCHC: 33.9 G/DL (ref 32–37)
NON-UH HIE MCV: 85.7 FL (ref 80–100)
NON-UH HIE MPV: 10.1 FL (ref 7.4–10.4)
NON-UH HIE NA: 141 MMOL/L (ref 135–145)
NON-UH HIE PLATELET: 200 X10 (ref 150–450)
NON-UH HIE RBC: 4.1 X10 (ref 4.7–6.1)
NON-UH HIE RDW: 13.6 % (ref 11.5–14.5)
NON-UH HIE SATURATION: 24.8 % (ref 20–50)
NON-UH HIE TIBC: 367 UG/ML (ref 250–425)
NON-UH HIE WBC: 4.7 X10 (ref 4.5–11)

## 2023-11-10 PROCEDURE — 82270 OCCULT BLOOD FECES: CPT

## 2023-11-13 ENCOUNTER — OFFICE VISIT (OUTPATIENT)
Dept: PRIMARY CARE | Facility: CLINIC | Age: 63
End: 2023-11-13
Payer: COMMERCIAL

## 2023-11-13 ENCOUNTER — LAB REQUISITION (OUTPATIENT)
Dept: LAB | Facility: HOSPITAL | Age: 63
End: 2023-11-13
Payer: COMMERCIAL

## 2023-11-13 VITALS
BODY MASS INDEX: 27.63 KG/M2 | WEIGHT: 193 LBS | TEMPERATURE: 96.9 F | SYSTOLIC BLOOD PRESSURE: 150 MMHG | HEART RATE: 64 BPM | OXYGEN SATURATION: 98 % | HEIGHT: 70 IN | DIASTOLIC BLOOD PRESSURE: 80 MMHG

## 2023-11-13 DIAGNOSIS — D64.9 ANEMIA, UNSPECIFIED: ICD-10-CM

## 2023-11-13 DIAGNOSIS — R31.29 HEMATURIA, MICROSCOPIC: ICD-10-CM

## 2023-11-13 DIAGNOSIS — N18.31 STAGE 3A CHRONIC KIDNEY DISEASE (MULTI): Primary | ICD-10-CM

## 2023-11-13 PROCEDURE — 3048F LDL-C <100 MG/DL: CPT | Performed by: FAMILY MEDICINE

## 2023-11-13 PROCEDURE — 99213 OFFICE O/P EST LOW 20 MIN: CPT | Performed by: FAMILY MEDICINE

## 2023-11-13 PROCEDURE — 3060F POS MICROALBUMINURIA REV: CPT | Performed by: FAMILY MEDICINE

## 2023-11-13 PROCEDURE — 1036F TOBACCO NON-USER: CPT | Performed by: FAMILY MEDICINE

## 2023-11-13 PROCEDURE — 3077F SYST BP >= 140 MM HG: CPT | Performed by: FAMILY MEDICINE

## 2023-11-13 PROCEDURE — 3079F DIAST BP 80-89 MM HG: CPT | Performed by: FAMILY MEDICINE

## 2023-11-13 NOTE — PROGRESS NOTES
Subjective   Patient ID: Charito Rojas is a 63 y.o. male who presents for follow-up on chronic medical condition.    Assessment/Plan     Problem List Items Addressed This Visit       Stage 3a chronic kidney disease (CMS/HCC) - Primary     Other Visit Diagnoses       Hematuria, microscopic            Prevnar 20 on previous study  Continue follow-up with podiatry  Continue to follow-up with ophthalmology    Received flu vaccine    Diet exercise  cologuard negative June 2020 and had a colonoscopy 4 years ago  Cologuard negative in July 2023  Following with podiatry  Received Pneumovax  Discussed about diet exercise  Following up with ophthalmology with diabetic retinopathy every 6 months  Consider gabapentin for diabetic neuropathy patient stopped  Follow-up in 6 months     Wellness on next appointment    HPI  63-year-old male here for follow-up on    Abnormal lab work showed hematuria microscopic with elevated liver function and mild anemia  History of nephrolithiasis  We will consider CT abdomen pelvis without contrast consider FOBT x3    CBC BMP with iron studies -CT abdomen pelvis reviewed  Cholelithiasis we will still recommend patient to see urology information provided for cystoscopy  Advised patient to see nephrology as patient might have a chronic kidney disease stage III      Previous visit    HbA1c was 6.4 --> 6.7-->6.0      Hypertension.   Hyperlipidemia.  Uncontrolled diabetes with peripheral neuropathy fairly controlled without medication diabetic retinopathy  Hypothyroidism  Obesity.   Coronary artery disease, three-vessel bypass surgery February 2011 without active symptoms. Maintained on appropriate medications and dual antiplatelet agents    bilateral feet tingling numbness pins and needles secondary to diabetic neuropathy  No hypoglycemia no new signs and symptoms  Patient received flu vaccine       Discussed about getting shingles vaccine  Walking 5 miles almost every day not walking that much  "secondary to ulcer in the leg which is healing well was seen by podiatry    Allergies   Allergen Reactions    Simvastatin Other and Unknown     lft changes       Current Outpatient Medications   Medication Sig Dispense Refill    aspirin 81 mg EC tablet 1 tab(s), ORAL, DAILY, 90 tab(s), Tablet EC      clopidogrel (Plavix) 75 mg tablet TAKE 1 TABLET BY MOUTH EVERY DAY 90 tablet 2    fenofibrate (Triglide) 160 mg tablet Take 0.5 tablets (80 mg) by mouth once daily. 45 tablet 2    insulin glargine (Toujeo SoloStar U-300 Insulin) 300 unit/mL (1.5 mL) injection Inject 15 Units under the skin once daily at bedtime. Take as directed per insulin instructions. 4.5 mL 3    insulin lispro (HumaLOG) 100 unit/mL injection INJECT 14 UNITS SUBQUTANIOUSLY 3 TIMES DAILY 1 each 1    levothyroxine (Synthroid, Levoxyl) 50 mcg tablet TAKE 1 TABLET BY MOUTH EVERY DAY 90 tablet 2    metoprolol succinate XL (Toprol-XL) 50 mg 24 hr tablet TAKE 1 TABLET BY MOUTH EVERY DAY 90 tablet 2    simvastatin (Zocor) 20 mg tablet TAKE 1 TABLET BY MOUTH EVERY DAY 90 tablet 2     No current facility-administered medications for this visit.       Objective   Visit Vitals  /80 (BP Location: Left arm, Patient Position: Sitting)   Pulse 64   Temp 36.1 °C (96.9 °F)   Ht 1.778 m (5' 10\")   Wt 87.5 kg (193 lb)   SpO2 98%   BMI 27.69 kg/m²   Smoking Status Never   BSA 2.08 m²     B/l le foot numbness chronic   Pilse intact  Left toes deformity  Skin normal  Constitutional:       General: He is not in acute distress.     Appearance: Normal appearance.   HENT:      Head: Normocephalic and atraumatic.      Nose: Nose normal.   Eyes:      Extraocular Movements: Extraocular movements intact.      Conjunctiva/sclera: Conjunctivae normal.   Cardiovascular:      Rate and Rhythm: Normal rate and regular rhythm.   Pulmonary:      Effort: Pulmonary effort is normal.      Breath sounds: Normal breath sounds.   Skin:     General: Skin is warm.   Neurological:      " Mental Status: He is alert and oriented to person, place, and time.   Psychiatric:         Mood and Affect: Mood normal.         Behavior: Behavior normal.   Immunization History   Administered Date(s) Administered    Influenza, injectable, MDCK, preservative free, quadrivalent 09/07/2017    Influenza, seasonal, injectable, preservative free 09/01/2014       Review of Systems    Orders Only on 11/10/2023   Component Date Value Ref Range Status    [facility] WBC 11/10/2023 4.7  4.5 - 11.0 x10 Final    [facility] HCT 11/10/2023 35.1 (L)  41.0 - 52.0 % Final    [facility] HGB 11/10/2023 11.9 (L)  13.5 - 17.5 g/dL Final    [facility] MCH 11/10/2023 29.0  27.0 - 34.0 pg Final    [facility] RBC 11/10/2023 4.10 (L)  4.70 - 6.10 x10 Final    [facility] MCV 11/10/2023 85.7  80.0 - 100.0 fL Final    [facility] MCHC 11/10/2023 33.9  32.0 - 37.0 g/dL Final    [facility] Platelet 11/10/2023 200  150 - 450 x10 Final    [facility] MPV 11/10/2023 10.1  7.4 - 10.4 fL Final    [facility] RDW 11/10/2023 13.6  11.5 - 14.5 % Final    [facility] Instr WBC ND 11/10/2023 4.7   Final    [facility] Saturation 11/10/2023 24.8  20.0 - 50.0 % Final    [facility] TIBC 11/10/2023 367  250 - 425 ug/ml Final    [facility] IRON 11/10/2023 91  65 - 175 ug/dl Final    [facility] FERRITIN 11/10/2023 143  22 - 322 ng/mL Final    [facility] Glucose 11/10/2023 99  74 - 106 mg/dL Final    [facility] Glomerular Filtration R* 11/10/2023 41  mL/min/1.73m? Final    [facility] BUN/Creat Ratio 11/10/2023 13.5   Final    [facility] Creatinine 11/10/2023 1.7 (H)  0.6 - 1.1 mg/dL Final    [facility] GFR AA 11/10/2023 50   Final    [facility] Na 11/10/2023 141  135 - 145 mmol/L Final    [facility] CO2, venous 11/10/2023 27.0  20.0 - 31.0 mmol/L Final    [facility] Calculated Osmolality 11/10/2023 285  275 - 295 mOsm/kg Final    [facility] Chloride 11/10/2023 106  98 - 107 mmol/L Final    [facility] BUN 11/10/2023 23  9 - 23 mg/dL Final    [facility] K 11/10/2023 4.8   3.5 - 5.1 mmol/L Final    [facility] Calcium 11/10/2023 10.1  8.7 - 10.4 mg/dL Final   Lab on 11/07/2023   Component Date Value Ref Range Status    Prostate Specific Antigen,Screen 11/07/2023 0.77  <=4.00 ng/mL Final    Thyroid Stimulating Hormone 11/07/2023 2.47  0.44 - 3.98 mIU/L Final    Cholesterol 11/07/2023 112  0 - 199 mg/dL Final    HDL-Cholesterol 11/07/2023 48.2  mg/dL Final    Cholesterol/HDL Ratio 11/07/2023 2.3   Final    LDL Calculated 11/07/2023 54  <=99 mg/dL Final    VLDL 11/07/2023 10  0 - 40 mg/dL Final    Triglycerides 11/07/2023 51  0 - 149 mg/dL Final    Non HDL Cholesterol 11/07/2023 64  0 - 149 mg/dL Final    Glucose 11/07/2023 113 (H)  74 - 99 mg/dL Final    Sodium 11/07/2023 139  136 - 145 mmol/L Final    Potassium 11/07/2023 4.5  3.5 - 5.3 mmol/L Final    Chloride 11/07/2023 103  98 - 107 mmol/L Final    Bicarbonate 11/07/2023 29  21 - 32 mmol/L Final    Anion Gap 11/07/2023 12  10 - 20 mmol/L Final    Urea Nitrogen 11/07/2023 22  6 - 23 mg/dL Final    Creatinine 11/07/2023 1.64 (H)  0.50 - 1.30 mg/dL Final    eGFR 11/07/2023 47 (L)  >60 mL/min/1.73m*2 Final    Calcium 11/07/2023 10.1  8.6 - 10.6 mg/dL Final    Albumin 11/07/2023 4.6  3.4 - 5.0 g/dL Final    Alkaline Phosphatase 11/07/2023 32 (L)  33 - 136 U/L Final    Total Protein 11/07/2023 7.2  6.4 - 8.2 g/dL Final    AST 11/07/2023 20  9 - 39 U/L Final    Bilirubin, Total 11/07/2023 0.4  0.0 - 1.2 mg/dL Final    ALT 11/07/2023 15  10 - 52 U/L Final    WBC 11/07/2023 5.5  4.4 - 11.3 x10*3/uL Final    nRBC 11/07/2023 0.0  0.0 - 0.0 /100 WBCs Final    RBC 11/07/2023 4.16 (L)  4.50 - 5.90 x10*6/uL Final    Hemoglobin 11/07/2023 11.8 (L)  13.5 - 17.5 g/dL Final    Hematocrit 11/07/2023 37.1 (L)  41.0 - 52.0 % Final    MCV 11/07/2023 89  80 - 100 fL Final    MCH 11/07/2023 28.4  26.0 - 34.0 pg Final    MCHC 11/07/2023 31.8 (L)  32.0 - 36.0 g/dL Final    RDW 11/07/2023 13.0  11.5 - 14.5 % Final    Platelets 11/07/2023 226  150 - 450  x10*3/uL Final    Color, Urine 11/07/2023 Yellow  Straw, Yellow Final    Appearance, Urine 11/07/2023 Clear  Clear Final    Specific Gravity, Urine 11/07/2023 1.014  1.005 - 1.035 Final    pH, Urine 11/07/2023 5.0  5.0, 5.5, 6.0, 6.5, 7.0, 7.5, 8.0 Final    Protein, Urine 11/07/2023 NEGATIVE  NEGATIVE mg/dL Final    Glucose, Urine 11/07/2023 NEGATIVE  NEGATIVE mg/dL Final    Blood, Urine 11/07/2023 LARGE (3+) (A)  NEGATIVE Final    Ketones, Urine 11/07/2023 NEGATIVE  NEGATIVE mg/dL Final    Bilirubin, Urine 11/07/2023 NEGATIVE  NEGATIVE Final    Urobilinogen, Urine 11/07/2023 <2.0  <2.0 mg/dL Final    Nitrite, Urine 11/07/2023 NEGATIVE  NEGATIVE Final    Leukocyte Esterase, Urine 11/07/2023 NEGATIVE  NEGATIVE Final    Albumin, Urine Random 11/07/2023 34.8  Not established mg/L Final    Creatinine, Urine Random 11/07/2023 104.7  20.0 - 370.0 mg/dL Final    Albumin/Creatine Ratio 11/07/2023 33.2 (H)  <30.0 ug/mg Creat Final    WBC, Urine 11/07/2023 NONE  1-5, NONE /HPF Final    RBC, Urine 11/07/2023 6-10 (A)  NONE, 1-2, 3-5 /HPF Final   Office Visit on 11/07/2023   Component Date Value Ref Range Status    POC HEMOGLOBIN A1c 11/07/2023 6.0  4.2 - 6.5 % Final       Radiology: Reviewed imaging in powerchart.  No results found.    No family history on file.  Social History     Socioeconomic History    Marital status: Unknown     Spouse name: None    Number of children: None    Years of education: None    Highest education level: None   Occupational History    None   Tobacco Use    Smoking status: Never    Smokeless tobacco: Never   Substance and Sexual Activity    Alcohol use: Never    Drug use: Never    Sexual activity: None   Other Topics Concern    None   Social History Narrative    None     Social Determinants of Health     Financial Resource Strain: Not on file   Food Insecurity: Not on file   Transportation Needs: Not on file   Physical Activity: Not on file   Stress: Not on file   Social Connections: Not on file    Intimate Partner Violence: Not on file   Housing Stability: Not on file     Past Medical History:   Diagnosis Date    Atherosclerosis of coronary artery bypass graft(s) without angina pectoris 06/16/2018    Coronary artery disease involving coronary bypass graft of native heart    Other specified postprocedural states 07/09/2017    History of colonoscopy    Personal history of other diseases of the circulatory system 01/16/2018    History of cardiac disorder    Radiculopathy, lumbar region 02/11/2016    Chronic radicular low back pain     Past Surgical History:   Procedure Laterality Date    MOUTH SURGERY  01/08/2015    Oral Surgery Tooth Extraction    OTHER SURGICAL HISTORY  02/20/2019    Cataract surgery    OTHER SURGICAL HISTORY  09/18/2019    Eye surgery       Charting was completed using voice recognition technology and may include unintended errors.       Physical Exam

## 2023-11-14 LAB
HEMOCCULT SP1 STL QL: NEGATIVE
HEMOCCULT SP2 STL QL: NEGATIVE
OCCULT BLOOD, STOOL X3: NEGATIVE

## 2024-01-14 DIAGNOSIS — E11.69 TYPE 2 DIABETES MELLITUS WITH OTHER SPECIFIED COMPLICATION (MULTI): ICD-10-CM

## 2024-01-14 DIAGNOSIS — E78.2 MIXED HYPERLIPIDEMIA: ICD-10-CM

## 2024-01-15 RX ORDER — SIMVASTATIN 20 MG/1
TABLET, FILM COATED ORAL
Qty: 90 TABLET | Refills: 2 | Status: SHIPPED | OUTPATIENT
Start: 2024-01-15

## 2024-02-03 DIAGNOSIS — E11.42 CONTROLLED TYPE 2 DIABETES MELLITUS WITH DIABETIC POLYNEUROPATHY, WITH LONG-TERM CURRENT USE OF INSULIN (MULTI): ICD-10-CM

## 2024-02-03 DIAGNOSIS — Z79.4 CONTROLLED TYPE 2 DIABETES MELLITUS WITH DIABETIC POLYNEUROPATHY, WITH LONG-TERM CURRENT USE OF INSULIN (MULTI): ICD-10-CM

## 2024-02-06 RX ORDER — INSULIN LISPRO 100 [IU]/ML
INJECTION, SOLUTION INTRAVENOUS; SUBCUTANEOUS
Qty: 1 EACH | Refills: 1 | Status: SHIPPED | OUTPATIENT
Start: 2024-02-06 | End: 2024-04-18

## 2024-02-19 DIAGNOSIS — I25.810 ATHEROSCLEROSIS OF CORONARY ARTERY BYPASS GRAFT(S) WITHOUT ANGINA PECTORIS: ICD-10-CM

## 2024-02-19 DIAGNOSIS — E03.9 HYPOTHYROIDISM, UNSPECIFIED: ICD-10-CM

## 2024-02-19 RX ORDER — CLOPIDOGREL BISULFATE 75 MG/1
TABLET ORAL
Qty: 90 TABLET | Refills: 2 | Status: SHIPPED | OUTPATIENT
Start: 2024-02-19

## 2024-02-19 RX ORDER — METOPROLOL SUCCINATE 50 MG/1
TABLET, EXTENDED RELEASE ORAL
Qty: 90 TABLET | Refills: 2 | Status: SHIPPED | OUTPATIENT
Start: 2024-02-19

## 2024-02-19 RX ORDER — LEVOTHYROXINE SODIUM 50 UG/1
TABLET ORAL
Qty: 90 TABLET | Refills: 2 | Status: SHIPPED | OUTPATIENT
Start: 2024-02-19

## 2024-03-05 ENCOUNTER — OFFICE VISIT (OUTPATIENT)
Dept: PRIMARY CARE | Facility: CLINIC | Age: 64
End: 2024-03-05
Payer: COMMERCIAL

## 2024-03-05 VITALS
OXYGEN SATURATION: 97 % | SYSTOLIC BLOOD PRESSURE: 131 MMHG | DIASTOLIC BLOOD PRESSURE: 69 MMHG | TEMPERATURE: 97 F | WEIGHT: 192 LBS | HEIGHT: 70 IN | HEART RATE: 68 BPM | BODY MASS INDEX: 27.49 KG/M2

## 2024-03-05 DIAGNOSIS — E11.42 DIABETIC POLYNEUROPATHY ASSOCIATED WITH TYPE 2 DIABETES MELLITUS (MULTI): ICD-10-CM

## 2024-03-05 DIAGNOSIS — N18.31 STAGE 3A CHRONIC KIDNEY DISEASE (MULTI): ICD-10-CM

## 2024-03-05 DIAGNOSIS — E11.59 HYPERTENSION ASSOCIATED WITH DIABETES (MULTI): ICD-10-CM

## 2024-03-05 DIAGNOSIS — I15.2 HYPERTENSION ASSOCIATED WITH DIABETES (MULTI): ICD-10-CM

## 2024-03-05 DIAGNOSIS — E11.69 COMBINED HYPERLIPIDEMIA ASSOCIATED WITH TYPE 2 DIABETES MELLITUS (MULTI): ICD-10-CM

## 2024-03-05 DIAGNOSIS — Z00.00 VISIT FOR PREVENTIVE HEALTH EXAMINATION: Primary | ICD-10-CM

## 2024-03-05 DIAGNOSIS — E78.2 COMBINED HYPERLIPIDEMIA ASSOCIATED WITH TYPE 2 DIABETES MELLITUS (MULTI): ICD-10-CM

## 2024-03-05 LAB — POC HEMOGLOBIN A1C: 6.9 % (ref 4.2–6.5)

## 2024-03-05 PROCEDURE — 83036 HEMOGLOBIN GLYCOSYLATED A1C: CPT | Performed by: FAMILY MEDICINE

## 2024-03-05 PROCEDURE — G0444 DEPRESSION SCREEN ANNUAL: HCPCS | Performed by: FAMILY MEDICINE

## 2024-03-05 PROCEDURE — 4010F ACE/ARB THERAPY RXD/TAKEN: CPT | Performed by: FAMILY MEDICINE

## 2024-03-05 PROCEDURE — 1036F TOBACCO NON-USER: CPT | Performed by: FAMILY MEDICINE

## 2024-03-05 PROCEDURE — 99497 ADVNCD CARE PLAN 30 MIN: CPT | Performed by: FAMILY MEDICINE

## 2024-03-05 PROCEDURE — 99214 OFFICE O/P EST MOD 30 MIN: CPT | Performed by: FAMILY MEDICINE

## 2024-03-05 PROCEDURE — 3078F DIAST BP <80 MM HG: CPT | Performed by: FAMILY MEDICINE

## 2024-03-05 PROCEDURE — G0439 PPPS, SUBSEQ VISIT: HCPCS | Performed by: FAMILY MEDICINE

## 2024-03-05 PROCEDURE — 3075F SYST BP GE 130 - 139MM HG: CPT | Performed by: FAMILY MEDICINE

## 2024-03-05 RX ORDER — LISINOPRIL 5 MG/1
1 TABLET ORAL DAILY
COMMUNITY
Start: 2024-01-25

## 2024-03-05 ASSESSMENT — ACTIVITIES OF DAILY LIVING (ADL)
MANAGING_FINANCES: INDEPENDENT
GROCERY_SHOPPING: INDEPENDENT
DRESSING: INDEPENDENT
DOING_HOUSEWORK: INDEPENDENT
TAKING_MEDICATION: INDEPENDENT
BATHING: INDEPENDENT

## 2024-03-05 ASSESSMENT — PATIENT HEALTH QUESTIONNAIRE - PHQ9
1. LITTLE INTEREST OR PLEASURE IN DOING THINGS: NOT AT ALL
1. LITTLE INTEREST OR PLEASURE IN DOING THINGS: NOT AT ALL
2. FEELING DOWN, DEPRESSED OR HOPELESS: NOT AT ALL
2. FEELING DOWN, DEPRESSED OR HOPELESS: NOT AT ALL
SUM OF ALL RESPONSES TO PHQ9 QUESTIONS 1 AND 2: 0
SUM OF ALL RESPONSES TO PHQ9 QUESTIONS 1 AND 2: 0

## 2024-03-05 NOTE — PROGRESS NOTES
Subjective   Patient ID: Charito Rojas is a 63 y.o. male who presents for follow-up on chronic medical condition.    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 (CMS/HCC)    Diabetic neuropathy (CMS/HCC)    Hypertension associated with diabetes (CMS/HCC)    Stage 3a chronic kidney disease (CMS/HCC)     Other Visit Diagnoses       Visit for preventive health examination    -  Primary        Prevnar 20  uptodate  Continue follow-up with podiatry  Continue to follow-up with ophthalmology    Received flu vaccine    Diet exercise  cologuard negative June 2020 and had a colonoscopy 4 years ago  Cologuard negative in July 2023  Following with podiatry  Received Pneumovax  Discussed about diet exercise  Following up with ophthalmology with diabetic retinopathy every 6 months  Consider gabapentin for diabetic neuropathy patient stopped  Follow-up in 6 months         HPI  63-year-old male here for Medicare wellness visit and follow-up on    Workup on previous visit abnormal lab work showed hematuria microscopic with elevated liver function and mild anemia  History of nephrolithiasis  FOBT x 3 negative  CBC BMP with iron studies -CT abdomen pelvis reviewed  Cholelithiasis we will still recommend patient to see urology information provided for cystoscopy  Advised patient to see nephrology as patient might have a chronic kidney disease stage III  Was seen by nephrology  Next appointment in April    HbA1c  6.9  <- 6.4 --> 6.7-->6.0    Discussed about discussed about diet and exercise . Healthy diet - exercise 30 min / day for 5 days a week. Risk and benefit of medications explained to patient.  Continue current medication  Was seen by urology for microscopic hematuria per patient it was secondary to nephrolithiasis per urology      Hypertension.   Hyperlipidemia.  Uncontrolled diabetes with peripheral neuropathy fairly controlled without  medication diabetic retinopathy  Hypothyroidism  Obesity.   Coronary artery disease, three-vessel bypass surgery February 2011 without active symptoms. Maintained on appropriate medications and dual antiplatelet agents    bilateral feet tingling numbness pins and needles secondary to diabetic neuropathy  No hypoglycemia no new signs and symptoms  Patient received flu vaccine       Discussed about getting shingles vaccine  Walking 5 miles almost every day     Advance directives:. Advanced Care Planning discussed and documented advance care plan or surrogate decision maker documented in the medical record.   A Conversation about Advance directives of at least 16 minutes has occurred. Actual time spent: I spent >16 minutes discussing Advance Care Planning including the explanation and discussion of advance directives. If patient does not have current up to date documents, examples and information provided on how to create both Living Will and Power of . Patient was encouraged to work on completing these documents.  Conversation with: The conversation with the patient and/or participants was voluntary.  Documents discussed and/or completed: Living Will was discussed with participants. Healthcare POA was discussed with participants. Patient educated on how to obtain Living Will and Power of . Discussed patient end of life choices.   Patient does not have a living will or healthcare power of .   Patient is full code.      No Known Allergies      Current Outpatient Medications   Medication Sig Dispense Refill    aspirin 81 mg EC tablet 1 tab(s), ORAL, DAILY, 90 tab(s), Tablet EC      clopidogrel (Plavix) 75 mg tablet TAKE 1 TABLET BY MOUTH EVERY DAY 90 tablet 2    fenofibrate (Triglide) 160 mg tablet Take 0.5 tablets (80 mg) by mouth once daily. 45 tablet 2    insulin glargine (Toujeo SoloStar U-300 Insulin) 300 unit/mL (1.5 mL) injection Inject 15 Units under the skin once daily at bedtime. Take as  "directed per insulin instructions. 4.5 mL 3    insulin lispro (HumaLOG KwikPen Insulin) 100 unit/mL injection INJECT 14 UNITS SUBQUTANIOUSLY 3 TIMES DAILY 1 each 1    levothyroxine (Synthroid, Levoxyl) 50 mcg tablet TAKE 1 TABLET BY MOUTH EVERY DAY 90 tablet 2    lisinopril 5 mg tablet Take 1 tablet (5 mg) by mouth once daily.      metoprolol succinate XL (Toprol-XL) 50 mg 24 hr tablet TAKE 1 TABLET BY MOUTH EVERY DAY 90 tablet 2    simvastatin (Zocor) 20 mg tablet TAKE 1 TABLET BY MOUTH EVERY DAY 90 tablet 2     No current facility-administered medications for this visit.       Objective   Visit Vitals  /69 (BP Location: Left arm, Patient Position: Sitting)   Pulse 68   Temp 36.1 °C (97 °F)   Ht 1.778 m (5' 10\")   Wt 87.1 kg (192 lb)   SpO2 97%   BMI 27.55 kg/m²   Smoking Status Never   BSA 2.07 m²     B/l le foot numbness chronic   Pilse intact  Left toes deformity  Skin normal  Constitutional:       General: He is not in acute distress.     Appearance: Normal appearance.   HENT:      Head: Normocephalic and atraumatic.      Nose: Nose normal.   Eyes:      Extraocular Movements: Extraocular movements intact.      Conjunctiva/sclera: Conjunctivae normal.   Cardiovascular:      Rate and Rhythm: Normal rate and regular rhythm.   Pulmonary:      Effort: Pulmonary effort is normal.      Breath sounds: Normal breath sounds.   Skin:     General: Skin is warm.   Neurological:      Mental Status: He is alert and oriented to person, place, and time.   Psychiatric:         Mood and Affect: Mood normal.         Behavior: Behavior normal.   Immunization History   Administered Date(s) Administered    Flu vaccine, quadrivalent, no egg protein, age 6 month or greater (FLUCELVAX) 09/07/2017    Influenza, seasonal, injectable, preservative free 09/01/2014       Review of Systems    Lab Requisition on 11/10/2023   Component Date Value Ref Range Status    Occult Blood, Stool X1 11/10/2023 Negative  Negative Final    Occult " Blood, Stool x2 11/10/2023 Negative  Negative Final    Occult Blood, Stool x3 11/10/2023 Negative  Negative Final   Orders Only on 11/10/2023   Component Date Value Ref Range Status    [facility] WBC 11/10/2023 4.7  4.5 - 11.0 x10 Final    [facility] HCT 11/10/2023 35.1 (L)  41.0 - 52.0 % Final    [facility] HGB 11/10/2023 11.9 (L)  13.5 - 17.5 g/dL Final    [facility] MCH 11/10/2023 29.0  27.0 - 34.0 pg Final    [facility] RBC 11/10/2023 4.10 (L)  4.70 - 6.10 x10 Final    [facility] MCV 11/10/2023 85.7  80.0 - 100.0 fL Final    [facility] MCHC 11/10/2023 33.9  32.0 - 37.0 g/dL Final    [facility] Platelet 11/10/2023 200  150 - 450 x10 Final    [facility] MPV 11/10/2023 10.1  7.4 - 10.4 fL Final    [facility] RDW 11/10/2023 13.6  11.5 - 14.5 % Final    [facility] Instr WBC ND 11/10/2023 4.7   Final    [facility] Saturation 11/10/2023 24.8  20.0 - 50.0 % Final    [facility] TIBC 11/10/2023 367  250 - 425 ug/ml Final    [facility] IRON 11/10/2023 91  65 - 175 ug/dl Final    [facility] FERRITIN 11/10/2023 143  22 - 322 ng/mL Final    [facility] Glucose 11/10/2023 99  74 - 106 mg/dL Final    [facility] Glomerular Filtration R* 11/10/2023 41  mL/min/1.73m? Final    [facility] BUN/Creat Ratio 11/10/2023 13.5   Final    [facility] Creatinine 11/10/2023 1.7 (H)  0.6 - 1.1 mg/dL Final    [facility] GFR AA 11/10/2023 50   Final    [facility] Na 11/10/2023 141  135 - 145 mmol/L Final    [facility] CO2, venous 11/10/2023 27.0  20.0 - 31.0 mmol/L Final    [facility] Calculated Osmolality 11/10/2023 285  275 - 295 mOsm/kg Final    [facility] Chloride 11/10/2023 106  98 - 107 mmol/L Final    [facility] BUN 11/10/2023 23  9 - 23 mg/dL Final    [facility] K 11/10/2023 4.8  3.5 - 5.1 mmol/L Final    [facility] Calcium 11/10/2023 10.1  8.7 - 10.4 mg/dL Final   Lab on 11/07/2023   Component Date Value Ref Range Status    Prostate Specific Antigen,Screen 11/07/2023 0.77  <=4.00 ng/mL Final    Thyroid Stimulating Hormone 11/07/2023 2.47   0.44 - 3.98 mIU/L Final    Cholesterol 11/07/2023 112  0 - 199 mg/dL Final    HDL-Cholesterol 11/07/2023 48.2  mg/dL Final    Cholesterol/HDL Ratio 11/07/2023 2.3   Final    LDL Calculated 11/07/2023 54  <=99 mg/dL Final    VLDL 11/07/2023 10  0 - 40 mg/dL Final    Triglycerides 11/07/2023 51  0 - 149 mg/dL Final    Non HDL Cholesterol 11/07/2023 64  0 - 149 mg/dL Final    Glucose 11/07/2023 113 (H)  74 - 99 mg/dL Final    Sodium 11/07/2023 139  136 - 145 mmol/L Final    Potassium 11/07/2023 4.5  3.5 - 5.3 mmol/L Final    Chloride 11/07/2023 103  98 - 107 mmol/L Final    Bicarbonate 11/07/2023 29  21 - 32 mmol/L Final    Anion Gap 11/07/2023 12  10 - 20 mmol/L Final    Urea Nitrogen 11/07/2023 22  6 - 23 mg/dL Final    Creatinine 11/07/2023 1.64 (H)  0.50 - 1.30 mg/dL Final    eGFR 11/07/2023 47 (L)  >60 mL/min/1.73m*2 Final    Calcium 11/07/2023 10.1  8.6 - 10.6 mg/dL Final    Albumin 11/07/2023 4.6  3.4 - 5.0 g/dL Final    Alkaline Phosphatase 11/07/2023 32 (L)  33 - 136 U/L Final    Total Protein 11/07/2023 7.2  6.4 - 8.2 g/dL Final    AST 11/07/2023 20  9 - 39 U/L Final    Bilirubin, Total 11/07/2023 0.4  0.0 - 1.2 mg/dL Final    ALT 11/07/2023 15  10 - 52 U/L Final    WBC 11/07/2023 5.5  4.4 - 11.3 x10*3/uL Final    nRBC 11/07/2023 0.0  0.0 - 0.0 /100 WBCs Final    RBC 11/07/2023 4.16 (L)  4.50 - 5.90 x10*6/uL Final    Hemoglobin 11/07/2023 11.8 (L)  13.5 - 17.5 g/dL Final    Hematocrit 11/07/2023 37.1 (L)  41.0 - 52.0 % Final    MCV 11/07/2023 89  80 - 100 fL Final    MCH 11/07/2023 28.4  26.0 - 34.0 pg Final    MCHC 11/07/2023 31.8 (L)  32.0 - 36.0 g/dL Final    RDW 11/07/2023 13.0  11.5 - 14.5 % Final    Platelets 11/07/2023 226  150 - 450 x10*3/uL Final    Color, Urine 11/07/2023 Yellow  Straw, Yellow Final    Appearance, Urine 11/07/2023 Clear  Clear Final    Specific Gravity, Urine 11/07/2023 1.014  1.005 - 1.035 Final    pH, Urine 11/07/2023 5.0  5.0, 5.5, 6.0, 6.5, 7.0, 7.5, 8.0 Final    Protein, Urine  11/07/2023 NEGATIVE  NEGATIVE mg/dL Final    Glucose, Urine 11/07/2023 NEGATIVE  NEGATIVE mg/dL Final    Blood, Urine 11/07/2023 LARGE (3+) (A)  NEGATIVE Final    Ketones, Urine 11/07/2023 NEGATIVE  NEGATIVE mg/dL Final    Bilirubin, Urine 11/07/2023 NEGATIVE  NEGATIVE Final    Urobilinogen, Urine 11/07/2023 <2.0  <2.0 mg/dL Final    Nitrite, Urine 11/07/2023 NEGATIVE  NEGATIVE Final    Leukocyte Esterase, Urine 11/07/2023 NEGATIVE  NEGATIVE Final    Albumin, Urine Random 11/07/2023 34.8  Not established mg/L Final    Creatinine, Urine Random 11/07/2023 104.7  20.0 - 370.0 mg/dL Final    Albumin/Creatine Ratio 11/07/2023 33.2 (H)  <30.0 ug/mg Creat Final    WBC, Urine 11/07/2023 NONE  1-5, NONE /HPF Final    RBC, Urine 11/07/2023 6-10 (A)  NONE, 1-2, 3-5 /HPF Final   Office Visit on 11/07/2023   Component Date Value Ref Range Status    POC HEMOGLOBIN A1c 11/07/2023 6.0  4.2 - 6.5 % Final       Radiology: Reviewed imaging in powerchart.  No results found.    No family history on file.  Social History     Socioeconomic History    Marital status: Unknown     Spouse name: None    Number of children: None    Years of education: None    Highest education level: None   Occupational History    None   Tobacco Use    Smoking status: Never    Smokeless tobacco: Never   Substance and Sexual Activity    Alcohol use: Never    Drug use: Never    Sexual activity: None   Other Topics Concern    None   Social History Narrative    None     Social Determinants of Health     Financial Resource Strain: Not on file   Food Insecurity: Not on file   Transportation Needs: Not on file   Physical Activity: Not on file   Stress: Not on file   Social Connections: Not on file   Intimate Partner Violence: Not on file   Housing Stability: Not on file     Past Medical History:   Diagnosis Date    Atherosclerosis of coronary artery bypass graft(s) without angina pectoris 06/16/2018    Coronary artery disease involving coronary bypass graft of native  heart    Other specified postprocedural states 07/09/2017    History of colonoscopy    Personal history of other diseases of the circulatory system 01/16/2018    History of cardiac disorder    Radiculopathy, lumbar region 02/11/2016    Chronic radicular low back pain     Past Surgical History:   Procedure Laterality Date    MOUTH SURGERY  01/08/2015    Oral Surgery Tooth Extraction    OTHER SURGICAL HISTORY  02/20/2019    Cataract surgery    OTHER SURGICAL HISTORY  09/18/2019    Eye surgery       Charting was completed using voice recognition technology and may include unintended errors.       Physical Exam

## 2024-04-18 DIAGNOSIS — E11.42 CONTROLLED TYPE 2 DIABETES MELLITUS WITH DIABETIC POLYNEUROPATHY, WITH LONG-TERM CURRENT USE OF INSULIN (MULTI): ICD-10-CM

## 2024-04-18 DIAGNOSIS — Z79.4 CONTROLLED TYPE 2 DIABETES MELLITUS WITH DIABETIC POLYNEUROPATHY, WITH LONG-TERM CURRENT USE OF INSULIN (MULTI): ICD-10-CM

## 2024-04-18 RX ORDER — INSULIN LISPRO 100 [IU]/ML
INJECTION, SOLUTION INTRAVENOUS; SUBCUTANEOUS
Qty: 3 EACH | Refills: 2 | Status: SHIPPED | OUTPATIENT
Start: 2024-04-18

## 2024-05-17 DIAGNOSIS — E11.69 TYPE 2 DIABETES MELLITUS WITH OTHER SPECIFIED COMPLICATION (MULTI): ICD-10-CM

## 2024-05-17 DIAGNOSIS — E78.2 MIXED HYPERLIPIDEMIA: ICD-10-CM

## 2024-05-17 RX ORDER — FENOFIBRATE 160 MG/1
80 TABLET ORAL DAILY
Qty: 45 TABLET | Refills: 2 | Status: SHIPPED | OUTPATIENT
Start: 2024-05-17 | End: 2025-05-17

## 2024-07-09 ENCOUNTER — LAB (OUTPATIENT)
Dept: LAB | Facility: LAB | Age: 64
End: 2024-07-09
Payer: COMMERCIAL

## 2024-07-09 ENCOUNTER — APPOINTMENT (OUTPATIENT)
Dept: PRIMARY CARE | Facility: CLINIC | Age: 64
End: 2024-07-09
Payer: COMMERCIAL

## 2024-07-09 VITALS
HEART RATE: 64 BPM | HEIGHT: 70 IN | WEIGHT: 194 LBS | DIASTOLIC BLOOD PRESSURE: 79 MMHG | BODY MASS INDEX: 27.77 KG/M2 | SYSTOLIC BLOOD PRESSURE: 120 MMHG | OXYGEN SATURATION: 98 %

## 2024-07-09 DIAGNOSIS — E11.69 TYPE 2 DIABETES MELLITUS WITH OTHER SPECIFIED COMPLICATION (MULTI): ICD-10-CM

## 2024-07-09 DIAGNOSIS — Z79.4 CONTROLLED TYPE 2 DIABETES MELLITUS WITH DIABETIC POLYNEUROPATHY, WITH LONG-TERM CURRENT USE OF INSULIN (MULTI): ICD-10-CM

## 2024-07-09 DIAGNOSIS — E11.59 HYPERTENSION ASSOCIATED WITH DIABETES (MULTI): ICD-10-CM

## 2024-07-09 DIAGNOSIS — I25.810 CORONARY ARTERY DISEASE INVOLVING CORONARY BYPASS GRAFT OF NATIVE HEART WITHOUT ANGINA PECTORIS: ICD-10-CM

## 2024-07-09 DIAGNOSIS — E03.9 HYPOTHYROIDISM, UNSPECIFIED: ICD-10-CM

## 2024-07-09 DIAGNOSIS — E11.69 COMBINED HYPERLIPIDEMIA ASSOCIATED WITH TYPE 2 DIABETES MELLITUS (MULTI): ICD-10-CM

## 2024-07-09 DIAGNOSIS — I15.2 HYPERTENSION ASSOCIATED WITH DIABETES (MULTI): ICD-10-CM

## 2024-07-09 DIAGNOSIS — E78.2 MIXED HYPERLIPIDEMIA: ICD-10-CM

## 2024-07-09 DIAGNOSIS — I25.810 ATHEROSCLEROSIS OF CORONARY ARTERY BYPASS GRAFT(S) WITHOUT ANGINA PECTORIS: ICD-10-CM

## 2024-07-09 DIAGNOSIS — E11.42 CONTROLLED TYPE 2 DIABETES MELLITUS WITH DIABETIC POLYNEUROPATHY, WITH LONG-TERM CURRENT USE OF INSULIN (MULTI): ICD-10-CM

## 2024-07-09 DIAGNOSIS — E78.2 COMBINED HYPERLIPIDEMIA ASSOCIATED WITH TYPE 2 DIABETES MELLITUS (MULTI): Primary | ICD-10-CM

## 2024-07-09 DIAGNOSIS — E11.69 COMBINED HYPERLIPIDEMIA ASSOCIATED WITH TYPE 2 DIABETES MELLITUS (MULTI): Primary | ICD-10-CM

## 2024-07-09 DIAGNOSIS — E78.2 COMBINED HYPERLIPIDEMIA ASSOCIATED WITH TYPE 2 DIABETES MELLITUS (MULTI): ICD-10-CM

## 2024-07-09 LAB
ALBUMIN SERPL BCP-MCNC: 4.6 G/DL (ref 3.4–5)
ALP SERPL-CCNC: 36 U/L (ref 33–136)
ALT SERPL W P-5'-P-CCNC: 12 U/L (ref 10–52)
ANION GAP SERPL CALC-SCNC: 13 MMOL/L (ref 10–20)
AST SERPL W P-5'-P-CCNC: 14 U/L (ref 9–39)
BILIRUB SERPL-MCNC: 0.4 MG/DL (ref 0–1.2)
BUN SERPL-MCNC: 28 MG/DL (ref 6–23)
CALCIUM SERPL-MCNC: 9.9 MG/DL (ref 8.6–10.6)
CHLORIDE SERPL-SCNC: 104 MMOL/L (ref 98–107)
CO2 SERPL-SCNC: 23 MMOL/L (ref 21–32)
CREAT SERPL-MCNC: 1.62 MG/DL (ref 0.5–1.3)
EGFRCR SERPLBLD CKD-EPI 2021: 48 ML/MIN/1.73M*2
ERYTHROCYTE [DISTWIDTH] IN BLOOD BY AUTOMATED COUNT: 13 % (ref 11.5–14.5)
GLUCOSE SERPL-MCNC: 134 MG/DL (ref 74–99)
HCT VFR BLD AUTO: 35.9 % (ref 41–52)
HGB BLD-MCNC: 11.5 G/DL (ref 13.5–17.5)
MCH RBC QN AUTO: 28 PG (ref 26–34)
MCHC RBC AUTO-ENTMCNC: 32 G/DL (ref 32–36)
MCV RBC AUTO: 87 FL (ref 80–100)
NRBC BLD-RTO: 0 /100 WBCS (ref 0–0)
PLATELET # BLD AUTO: 234 X10*3/UL (ref 150–450)
POC HEMOGLOBIN A1C: 6.3 % (ref 4.2–6.5)
POTASSIUM SERPL-SCNC: 4.9 MMOL/L (ref 3.5–5.3)
PROT SERPL-MCNC: 7.1 G/DL (ref 6.4–8.2)
RBC # BLD AUTO: 4.11 X10*6/UL (ref 4.5–5.9)
SODIUM SERPL-SCNC: 135 MMOL/L (ref 136–145)
WBC # BLD AUTO: 7.4 X10*3/UL (ref 4.4–11.3)

## 2024-07-09 PROCEDURE — 85027 COMPLETE CBC AUTOMATED: CPT

## 2024-07-09 PROCEDURE — G2211 COMPLEX E/M VISIT ADD ON: HCPCS | Performed by: FAMILY MEDICINE

## 2024-07-09 PROCEDURE — 4010F ACE/ARB THERAPY RXD/TAKEN: CPT | Performed by: FAMILY MEDICINE

## 2024-07-09 PROCEDURE — 99214 OFFICE O/P EST MOD 30 MIN: CPT | Performed by: FAMILY MEDICINE

## 2024-07-09 PROCEDURE — 1036F TOBACCO NON-USER: CPT | Performed by: FAMILY MEDICINE

## 2024-07-09 PROCEDURE — 83036 HEMOGLOBIN GLYCOSYLATED A1C: CPT | Performed by: FAMILY MEDICINE

## 2024-07-09 PROCEDURE — 3078F DIAST BP <80 MM HG: CPT | Performed by: FAMILY MEDICINE

## 2024-07-09 PROCEDURE — 3074F SYST BP LT 130 MM HG: CPT | Performed by: FAMILY MEDICINE

## 2024-07-09 PROCEDURE — 80053 COMPREHEN METABOLIC PANEL: CPT

## 2024-07-09 RX ORDER — CLOPIDOGREL BISULFATE 75 MG/1
75 TABLET ORAL DAILY
Qty: 90 TABLET | Refills: 2 | Status: SHIPPED | OUTPATIENT
Start: 2024-07-09

## 2024-07-09 RX ORDER — LISINOPRIL 5 MG/1
5 TABLET ORAL DAILY
Qty: 90 TABLET | Refills: 2 | Status: SHIPPED | OUTPATIENT
Start: 2024-07-09 | End: 2025-07-09

## 2024-07-09 RX ORDER — INSULIN LISPRO 100 [IU]/ML
INJECTION, SOLUTION INTRAVENOUS; SUBCUTANEOUS
Qty: 3 EACH | Refills: 2 | Status: SHIPPED | OUTPATIENT
Start: 2024-07-09

## 2024-07-09 RX ORDER — INSULIN GLARGINE 300 [IU]/ML
15 INJECTION, SOLUTION SUBCUTANEOUS NIGHTLY
Qty: 4.5 ML | Refills: 2 | Status: SHIPPED | OUTPATIENT
Start: 2024-07-09

## 2024-07-09 RX ORDER — METOPROLOL SUCCINATE 50 MG/1
50 TABLET, EXTENDED RELEASE ORAL DAILY
Qty: 90 TABLET | Refills: 2 | Status: SHIPPED | OUTPATIENT
Start: 2024-07-09

## 2024-07-09 RX ORDER — SIMVASTATIN 20 MG/1
20 TABLET, FILM COATED ORAL DAILY
Qty: 90 TABLET | Refills: 2 | Status: SHIPPED | OUTPATIENT
Start: 2024-07-09

## 2024-07-09 RX ORDER — FENOFIBRATE 160 MG/1
80 TABLET ORAL DAILY
Qty: 45 TABLET | Refills: 2 | Status: SHIPPED | OUTPATIENT
Start: 2024-07-09 | End: 2025-07-09

## 2024-07-09 RX ORDER — LEVOTHYROXINE SODIUM 50 UG/1
50 TABLET ORAL DAILY
Qty: 90 TABLET | Refills: 2 | Status: SHIPPED | OUTPATIENT
Start: 2024-07-09

## 2024-07-09 NOTE — PROGRESS NOTES
Subjective   Patient ID: Charito Rojas is a 63 y.o. male who presents for follow-up on chronic medical condition.    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 (Multi) - Primary    Relevant Orders    CBC    Comprehensive Metabolic Panel    Coronary artery disease involving coronary bypass graft of native heart    Relevant Medications    clopidogrel (Plavix) 75 mg tablet    metoprolol succinate XL (Toprol-XL) 50 mg 24 hr tablet    Other Relevant Orders    CBC    Comprehensive Metabolic Panel    Hypertension associated with diabetes (Multi)    Relevant Medications    insulin glargine (Toujeo SoloStar U-300 Insulin) 300 unit/mL (1.5 mL) injection    lisinopril 5 mg tablet    Other Relevant Orders    CBC    Comprehensive Metabolic Panel     Other Visit Diagnoses       Atherosclerosis of coronary artery bypass graft(s) without angina pectoris        Relevant Medications    clopidogrel (Plavix) 75 mg tablet    metoprolol succinate XL (Toprol-XL) 50 mg 24 hr tablet    Type 2 diabetes mellitus with other specified complication (Multi)        Relevant Medications    fenofibrate (Triglide) 160 mg tablet    simvastatin (Zocor) 20 mg tablet    Mixed hyperlipidemia        Relevant Medications    fenofibrate (Triglide) 160 mg tablet    simvastatin (Zocor) 20 mg tablet    Controlled type 2 diabetes mellitus with diabetic polyneuropathy, with long-term current use of insulin (Multi)        Relevant Medications    insulin glargine (Toujeo SoloStar U-300 Insulin) 300 unit/mL (1.5 mL) injection    insulin lispro (HumaLOG KwikPen Insulin) 100 unit/mL injection    Other Relevant Orders    POCT glycosylated hemoglobin (Hb A1C) manually resulted    Hypothyroidism, unspecified        Relevant Medications    levothyroxine (Synthroid, Levoxyl) 50 mcg tablet          Prevnar 20  uptodate  Continue follow-up with podiatry  Continue to follow-up with  ophthalmology    Received flu vaccine    Diet exercise  cologuard negative June 2020 and had a colonoscopy 4 years ago  Cologuard negative in July 2023  Following with podiatry  Received Pneumovax  Discussed about diet exercise  Following up with ophthalmology with diabetic retinopathy every 6 months  Consider gabapentin for diabetic neuropathy patient stopped  Follow-up in 6 months         HPI  63-year-old male here for  follow-up on    Workup on previous visit abnormal lab work showed hematuria microscopic with elevated liver function and mild anemia  History of nephrolithiasis  FOBT x 3 negative  Cholelithiasis we will still recommend patient to see urology information provided for cystoscopy  Advised patient to see nephrology as patient might have a chronic kidney disease stage III  Was seen by nephrology      HbA1c  6.9 ->6.4 -> 6.7->6.0->6.3    Discussed about discussed about diet and exercise . Healthy diet - exercise 30 min / day for 5 days a week. Risk and benefit of medications explained to patient.  Continue current medication  Was seen by urology for microscopic hematuria per patient it was secondary to nephrolithiasis per urology      Hypertension.   Hyperlipidemia.  Uncontrolled diabetes with peripheral neuropathy fairly controlled without medication diabetic retinopathy  Hypothyroidism  Obesity.   Coronary artery disease, three-vessel bypass surgery February 2011 without active symptoms. Maintained on appropriate medications and dual antiplatelet agents    bilateral feet tingling numbness pins and needles secondary to diabetic neuropathy  No hypoglycemia no new signs and symptoms    Following with podiatry ophthalmology    Discussed about getting shingles vaccine  Walking 5 miles almost every day     Advance directives:. Advanced Care Planning discussed and documented advance care plan or surrogate decision maker documented in the medical record.   A Conversation about Advance directives of at least  16 minutes has occurred. Actual time spent: I spent >16 minutes discussing Advance Care Planning including the explanation and discussion of advance directives. If patient does not have current up to date documents, examples and information provided on how to create both Living Will and Power of . Patient was encouraged to work on completing these documents.  Conversation with: The conversation with the patient and/or participants was voluntary.  Documents discussed and/or completed: Living Will was discussed with participants. Healthcare POA was discussed with participants. Patient educated on how to obtain Living Will and Power of . Discussed patient end of life choices.   Patient does not have a living will or healthcare power of .   Patient is full code.      No Known Allergies      Current Outpatient Medications   Medication Sig Dispense Refill    aspirin 81 mg EC tablet 1 tab(s), ORAL, DAILY, 90 tab(s), Tablet EC      clopidogrel (Plavix) 75 mg tablet Take 1 tablet (75 mg) by mouth once daily. 90 tablet 2    fenofibrate (Triglide) 160 mg tablet Take 0.5 tablets (80 mg) by mouth once daily. 45 tablet 2    insulin glargine (Toujeo SoloStar U-300 Insulin) 300 unit/mL (1.5 mL) injection Inject 15 Units under the skin once daily at bedtime. Take as directed per insulin instructions. 4.5 mL 2    insulin lispro (HumaLOG KwikPen Insulin) 100 unit/mL injection INJECT 14 UNITS SUBQUTANIOUSLY 3 TIMES DAILY 3 each 2    levothyroxine (Synthroid, Levoxyl) 50 mcg tablet Take 1 tablet (50 mcg) by mouth once daily. Take on an empty stomach at the same time each day, either 30 to 60 minutes prior to breakfast 90 tablet 2    lisinopril 5 mg tablet Take 1 tablet (5 mg) by mouth once daily. 90 tablet 2    metoprolol succinate XL (Toprol-XL) 50 mg 24 hr tablet Take 1 tablet (50 mg) by mouth once daily. Do not crush or chew. 90 tablet 2    simvastatin (Zocor) 20 mg tablet Take 1 tablet (20 mg) by mouth once  "daily. 90 tablet 2     No current facility-administered medications for this visit.       Objective   Visit Vitals  /79 (BP Location: Left arm, Patient Position: Sitting)   Pulse 64   Ht 1.778 m (5' 10\")   Wt 88 kg (194 lb)   SpO2 98%   BMI 27.84 kg/m²   Smoking Status Never   BSA 2.08 m²     B/l le foot numbness chronic   Pilse intact  Left toes deformity  Skin normal  Constitutional:       General: He is not in acute distress.     Appearance: Normal appearance.   HENT:      Head: Normocephalic and atraumatic.      Nose: Nose normal.   Eyes:      Extraocular Movements: Extraocular movements intact.      Conjunctiva/sclera: Conjunctivae normal.   Cardiovascular:      Rate and Rhythm: Normal rate and regular rhythm.   Pulmonary:      Effort: Pulmonary effort is normal.      Breath sounds: Normal breath sounds.   Skin:     General: Skin is warm.   Neurological:      Mental Status: He is alert and oriented to person, place, and time.   Psychiatric:         Mood and Affect: Mood normal.         Behavior: Behavior normal.   Immunization History   Administered Date(s) Administered    Flu vaccine, quadrivalent, no egg protein, age 6 month or greater (FLUCELVAX) 09/07/2017    Influenza, seasonal, injectable, preservative free 09/01/2014       Review of Systems    No visits with results within 4 Month(s) from this visit.   Latest known visit with results is:   Office Visit on 03/05/2024   Component Date Value Ref Range Status    POC HEMOGLOBIN A1c 03/05/2024 6.9 (A)  4.2 - 6.5 % Final       Radiology: Reviewed imaging in powerchart.  No results found.    No family history on file.  Social History     Socioeconomic History    Marital status: Unknown     Spouse name: None    Number of children: None    Years of education: None    Highest education level: None   Occupational History    None   Tobacco Use    Smoking status: Never    Smokeless tobacco: Never   Substance and Sexual Activity    Alcohol use: Never    Drug use: " Never    Sexual activity: None   Other Topics Concern    None   Social History Narrative    None     Social Determinants of Health     Financial Resource Strain: Not on file   Food Insecurity: Not on file   Transportation Needs: Not on file   Physical Activity: Not on file   Stress: Not on file   Social Connections: Not on file   Intimate Partner Violence: Not on file   Housing Stability: Not on file     Past Medical History:   Diagnosis Date    Atherosclerosis of coronary artery bypass graft(s) without angina pectoris 06/16/2018    Coronary artery disease involving coronary bypass graft of native heart    Other specified postprocedural states 07/09/2017    History of colonoscopy    Personal history of other diseases of the circulatory system 01/16/2018    History of cardiac disorder    Radiculopathy, lumbar region 02/11/2016    Chronic radicular low back pain     Past Surgical History:   Procedure Laterality Date    MOUTH SURGERY  01/08/2015    Oral Surgery Tooth Extraction    OTHER SURGICAL HISTORY  02/20/2019    Cataract surgery    OTHER SURGICAL HISTORY  09/18/2019    Eye surgery       Charting was completed using voice recognition technology and may include unintended errors.       Physical Exam

## 2024-11-11 ENCOUNTER — APPOINTMENT (OUTPATIENT)
Dept: PRIMARY CARE | Facility: CLINIC | Age: 64
End: 2024-11-11
Payer: COMMERCIAL

## 2024-11-11 VITALS
DIASTOLIC BLOOD PRESSURE: 63 MMHG | HEART RATE: 66 BPM | WEIGHT: 194 LBS | SYSTOLIC BLOOD PRESSURE: 120 MMHG | OXYGEN SATURATION: 97 % | BODY MASS INDEX: 27.84 KG/M2 | TEMPERATURE: 96.8 F

## 2024-11-11 DIAGNOSIS — E78.2 COMBINED HYPERLIPIDEMIA ASSOCIATED WITH TYPE 2 DIABETES MELLITUS (MULTI): Primary | ICD-10-CM

## 2024-11-11 DIAGNOSIS — I15.2 HYPERTENSION ASSOCIATED WITH DIABETES (MULTI): ICD-10-CM

## 2024-11-11 DIAGNOSIS — N18.31 STAGE 3A CHRONIC KIDNEY DISEASE (MULTI): ICD-10-CM

## 2024-11-11 DIAGNOSIS — E11.69 COMBINED HYPERLIPIDEMIA ASSOCIATED WITH TYPE 2 DIABETES MELLITUS (MULTI): Primary | ICD-10-CM

## 2024-11-11 DIAGNOSIS — I25.810 CORONARY ARTERY DISEASE INVOLVING CORONARY BYPASS GRAFT OF NATIVE HEART WITHOUT ANGINA PECTORIS: ICD-10-CM

## 2024-11-11 DIAGNOSIS — E11.59 HYPERTENSION ASSOCIATED WITH DIABETES (MULTI): ICD-10-CM

## 2024-11-11 LAB — POC HEMOGLOBIN A1C: 5.8 % (ref 4.2–6.5)

## 2024-11-11 PROCEDURE — 4010F ACE/ARB THERAPY RXD/TAKEN: CPT | Performed by: FAMILY MEDICINE

## 2024-11-11 PROCEDURE — 99214 OFFICE O/P EST MOD 30 MIN: CPT | Performed by: FAMILY MEDICINE

## 2024-11-11 PROCEDURE — 1036F TOBACCO NON-USER: CPT | Performed by: FAMILY MEDICINE

## 2024-11-11 PROCEDURE — 3074F SYST BP LT 130 MM HG: CPT | Performed by: FAMILY MEDICINE

## 2024-11-11 PROCEDURE — 3078F DIAST BP <80 MM HG: CPT | Performed by: FAMILY MEDICINE

## 2024-11-11 PROCEDURE — 83036 HEMOGLOBIN GLYCOSYLATED A1C: CPT | Performed by: FAMILY MEDICINE

## 2024-11-11 RX ORDER — ERGOCALCIFEROL 1.25 MG/1
50000 CAPSULE ORAL WEEKLY
COMMUNITY
Start: 2024-08-06

## 2024-11-11 NOTE — PROGRESS NOTES
Subjective   Patient ID: Charito Rojas is a 64 y.o. male who presents for follow-up on chronic medical condition.    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 (Multi) - Primary    Relevant Orders    POCT glycosylated hemoglobin (Hb A1C) manually resulted (Completed)    CBC    Hepatic function panel    Lipid Panel    TSH with reflex to Free T4 if abnormal    Coronary artery disease involving coronary bypass graft of native heart    Relevant Orders    POCT glycosylated hemoglobin (Hb A1C) manually resulted (Completed)    CBC    Hepatic function panel    Lipid Panel    TSH with reflex to Free T4 if abnormal    Hypertension associated with diabetes (Multi)    Relevant Orders    POCT glycosylated hemoglobin (Hb A1C) manually resulted (Completed)    CBC    Hepatic function panel    Lipid Panel    TSH with reflex to Free T4 if abnormal    Stage 3a chronic kidney disease (Multi)    Relevant Orders    POCT glycosylated hemoglobin (Hb A1C) manually resulted (Completed)    CBC    Hepatic function panel    Lipid Panel    TSH with reflex to Free T4 if abnormal     Needs lipid panel PSA CBC TSH hepatic function child    Prevnar 20  uptodate  Continue follow-up with podiatry  Continue to follow-up with ophthalmology    Received flu vaccine    Diet exercise  cologuard negative June 2020 and had a colonoscopy 4 years ago  Cologuard negative in July 2023  Following with podiatry  Received Pneumovax  Discussed about diet exercise  Following up with ophthalmology with diabetic retinopathy every 6 months  Consider gabapentin for diabetic neuropathy patient stopped  Follow-up in 6 months         HPI    Consider EKG echocardiogram on next visit  Needs lab work -patient will come tomorrow fasting      64-year-old male here for  follow-up on    Workup on previous visit abnormal lab work showed hematuria microscopic with elevated liver function and mild  anemia  History of nephrolithiasis  FOBT x 3 negative  Cholelithiasis we will still recommend patient to see urology information provided for cystoscopy  Advised patient to see nephrology as patient might have a chronic kidney disease stage III  Was seen by nephrology    Received flu vaccine  HbA1c  6.9 ->6.4 -> 6.7->6.0->6.3-> 5.8    Discussed about discussed about diet and exercise . Healthy diet - exercise 30 min / day for 5 days a week. Risk and benefit of medications explained to patient.  Continue current medication  Was seen by urology for microscopic hematuria per patient it was secondary to nephrolithiasis per urology    No hypoglycemia    Hypertension.   Hyperlipidemia.  Uncontrolled diabetes with peripheral neuropathy fairly controlled without medication diabetic retinopathy  Hypothyroidism  Obesity.   Coronary artery disease, three-vessel bypass surgery February 2011 without active symptoms. Maintained on appropriate medications and dual antiplatelet agents  Currently not following up with cardiology      bilateral feet tingling numbness pins and needles secondary to diabetic neuropathy  No hypoglycemia no new signs and symptoms    Following with podiatry ophthalmology    Discussed about getting shingles vaccine  Walking 5 miles almost every day     Advance directives:. Advanced Care Planning discussed and documented advance care plan or surrogate decision maker documented in the medical record.   A Conversation about Advance directives of at least 16 minutes has occurred. Actual time spent: I spent >16 minutes discussing Advance Care Planning including the explanation and discussion of advance directives. If patient does not have current up to date documents, examples and information provided on how to create both Living Will and Power of . Patient was encouraged to work on completing these documents.  Conversation with: The conversation with the patient and/or participants was  voluntary.  Documents discussed and/or completed: Living Will was discussed with participants. Healthcare POA was discussed with participants. Patient educated on how to obtain Living Will and Power of . Discussed patient end of life choices.   Patient does not have a living will or healthcare power of .   Patient is full code.      No Known Allergies      Current Outpatient Medications   Medication Sig Dispense Refill    aspirin 81 mg EC tablet 1 tab(s), ORAL, DAILY, 90 tab(s), Tablet EC      clopidogrel (Plavix) 75 mg tablet Take 1 tablet (75 mg) by mouth once daily. 90 tablet 2    ergocalciferol (Vitamin D-2) 1.25 MG (84738 UT) capsule Take 1 capsule (50,000 Units) by mouth once a week. (Patient taking differently: Take 1 capsule (50,000 Units) by mouth once a week. Sometimes once a month)      fenofibrate (Triglide) 160 mg tablet Take 0.5 tablets (80 mg) by mouth once daily. 45 tablet 2    insulin glargine (Toujeo SoloStar U-300 Insulin) 300 unit/mL (1.5 mL) injection Inject 15 Units under the skin once daily at bedtime. Take as directed per insulin instructions. 4.5 mL 2    insulin lispro (HumaLOG KwikPen Insulin) 100 unit/mL injection INJECT 14 UNITS SUBQUTANIOUSLY 3 TIMES DAILY 3 each 2    levothyroxine (Synthroid, Levoxyl) 50 mcg tablet Take 1 tablet (50 mcg) by mouth once daily. Take on an empty stomach at the same time each day, either 30 to 60 minutes prior to breakfast 90 tablet 2    lisinopril 5 mg tablet Take 1 tablet (5 mg) by mouth once daily. 90 tablet 2    metoprolol succinate XL (Toprol-XL) 50 mg 24 hr tablet Take 1 tablet (50 mg) by mouth once daily. Do not crush or chew. 90 tablet 2    simvastatin (Zocor) 20 mg tablet Take 1 tablet (20 mg) by mouth once daily. 90 tablet 2     No current facility-administered medications for this visit.       Objective   Visit Vitals  /63   Pulse 66   Temp 36 °C (96.8 °F)   Wt 88 kg (194 lb)   SpO2 97%   BMI 27.84 kg/m²   Smoking Status  Never   BSA 2.08 m²     B/l le foot numbness chronic   Pilse intact  Left toes deformity  Skin normal  Constitutional:       General: He is not in acute distress.     Appearance: Normal appearance.   HENT:      Head: Normocephalic and atraumatic.      Nose: Nose normal.   Eyes:      Extraocular Movements: Extraocular movements intact.      Conjunctiva/sclera: Conjunctivae normal.   Cardiovascular:      Rate and Rhythm: Normal rate and regular rhythm.   Pulmonary:      Effort: Pulmonary effort is normal.      Breath sounds: Normal breath sounds.   Skin:     General: Skin is warm.   Neurological:      Mental Status: He is alert and oriented to person, place, and time.   Psychiatric:         Mood and Affect: Mood normal.         Behavior: Behavior normal.   Immunization History   Administered Date(s) Administered    Flu vaccine (IIV4), preservative free *Check age/dose* 09/15/2015, 09/12/2016, 09/11/2018, 10/20/2018, 09/18/2019, 09/01/2021, 09/26/2022, 10/10/2023    Flu vaccine, quadrivalent, no egg protein, age 6 month or greater (FLUCELVAX) 09/07/2017    Flu vaccine, trivalent, preservative free, age 6 months and greater (Fluarix/Fluzone/Flulaval) 09/01/2014, 10/02/2024    Influenza, seasonal, injectable 10/02/2024    Pneumococcal conjugate vaccine, 20-valent (PREVNAR 20) 01/06/2023    Pneumococcal polysaccharide vaccine, 23-valent, age 2 years and older (PNEUMOVAX 23) 10/10/2017    Zoster vaccine, recombinant, adult (SHINGRIX) 05/18/2020       Review of Systems    Office Visit on 11/11/2024   Component Date Value Ref Range Status    POC HEMOGLOBIN A1c 11/11/2024 5.8  4.2 - 6.5 % Final       Radiology: Reviewed imaging in powerchart.  No results found.    No family history on file.  Social History     Socioeconomic History    Marital status: Unknown   Tobacco Use    Smoking status: Never    Smokeless tobacco: Never   Substance and Sexual Activity    Alcohol use: Never    Drug use: Never     Past Medical History:    Diagnosis Date    Atherosclerosis of coronary artery bypass graft(s) without angina pectoris 06/16/2018    Coronary artery disease involving coronary bypass graft of native heart    Other specified postprocedural states 07/09/2017    History of colonoscopy    Personal history of other diseases of the circulatory system 01/16/2018    History of cardiac disorder    Radiculopathy, lumbar region 02/11/2016    Chronic radicular low back pain     Past Surgical History:   Procedure Laterality Date    MOUTH SURGERY  01/08/2015    Oral Surgery Tooth Extraction    OTHER SURGICAL HISTORY  02/20/2019    Cataract surgery    OTHER SURGICAL HISTORY  09/18/2019    Eye surgery       Charting was completed using voice recognition technology and may include unintended errors.       Physical Exam

## 2024-11-12 ENCOUNTER — LAB (OUTPATIENT)
Dept: LAB | Facility: LAB | Age: 64
End: 2024-11-12
Payer: COMMERCIAL

## 2024-11-12 DIAGNOSIS — E11.59 HYPERTENSION ASSOCIATED WITH DIABETES (MULTI): ICD-10-CM

## 2024-11-12 DIAGNOSIS — I15.2 HYPERTENSION ASSOCIATED WITH DIABETES (MULTI): ICD-10-CM

## 2024-11-12 DIAGNOSIS — E78.2 COMBINED HYPERLIPIDEMIA ASSOCIATED WITH TYPE 2 DIABETES MELLITUS (MULTI): ICD-10-CM

## 2024-11-12 DIAGNOSIS — E11.69 COMBINED HYPERLIPIDEMIA ASSOCIATED WITH TYPE 2 DIABETES MELLITUS (MULTI): ICD-10-CM

## 2024-11-12 DIAGNOSIS — I25.810 CORONARY ARTERY DISEASE INVOLVING CORONARY BYPASS GRAFT OF NATIVE HEART WITHOUT ANGINA PECTORIS: ICD-10-CM

## 2024-11-12 DIAGNOSIS — N18.31 STAGE 3A CHRONIC KIDNEY DISEASE (MULTI): ICD-10-CM

## 2024-11-12 LAB
ALBUMIN SERPL BCP-MCNC: 4.2 G/DL (ref 3.4–5)
ALP SERPL-CCNC: 38 U/L (ref 33–136)
ALT SERPL W P-5'-P-CCNC: 13 U/L (ref 10–52)
AST SERPL W P-5'-P-CCNC: 16 U/L (ref 9–39)
BILIRUB DIRECT SERPL-MCNC: 0.1 MG/DL (ref 0–0.3)
BILIRUB SERPL-MCNC: 0.4 MG/DL (ref 0–1.2)
CHOLEST SERPL-MCNC: 104 MG/DL (ref 0–199)
CHOLESTEROL/HDL RATIO: 2.8
ERYTHROCYTE [DISTWIDTH] IN BLOOD BY AUTOMATED COUNT: 13.8 % (ref 11.5–14.5)
HCT VFR BLD AUTO: 39 % (ref 41–52)
HDLC SERPL-MCNC: 36.5 MG/DL
HGB BLD-MCNC: 12.4 G/DL (ref 13.5–17.5)
LDLC SERPL CALC-MCNC: 54 MG/DL
MCH RBC QN AUTO: 27.6 PG (ref 26–34)
MCHC RBC AUTO-ENTMCNC: 31.8 G/DL (ref 32–36)
MCV RBC AUTO: 87 FL (ref 80–100)
NON HDL CHOLESTEROL: 68 MG/DL (ref 0–149)
NRBC BLD-RTO: 0 /100 WBCS (ref 0–0)
PLATELET # BLD AUTO: 233 X10*3/UL (ref 150–450)
PROT SERPL-MCNC: 7.1 G/DL (ref 6.4–8.2)
RBC # BLD AUTO: 4.5 X10*6/UL (ref 4.5–5.9)
TRIGL SERPL-MCNC: 67 MG/DL (ref 0–149)
TSH SERPL-ACNC: 1.43 MIU/L (ref 0.44–3.98)
VLDL: 13 MG/DL (ref 0–40)
WBC # BLD AUTO: 5.3 X10*3/UL (ref 4.4–11.3)

## 2024-11-12 PROCEDURE — 80076 HEPATIC FUNCTION PANEL: CPT

## 2024-11-12 PROCEDURE — 84443 ASSAY THYROID STIM HORMONE: CPT

## 2024-11-12 PROCEDURE — 80061 LIPID PANEL: CPT

## 2024-11-12 PROCEDURE — 36415 COLL VENOUS BLD VENIPUNCTURE: CPT

## 2024-11-12 PROCEDURE — 85027 COMPLETE CBC AUTOMATED: CPT

## 2025-02-05 DIAGNOSIS — E11.59 HYPERTENSION ASSOCIATED WITH DIABETES (MULTI): ICD-10-CM

## 2025-02-05 DIAGNOSIS — I15.2 HYPERTENSION ASSOCIATED WITH DIABETES (MULTI): ICD-10-CM

## 2025-02-05 RX ORDER — LISINOPRIL 5 MG/1
5 TABLET ORAL DAILY
Qty: 90 TABLET | Refills: 2 | Status: SHIPPED | OUTPATIENT
Start: 2025-02-05

## 2025-03-10 ENCOUNTER — APPOINTMENT (OUTPATIENT)
Dept: PRIMARY CARE | Facility: CLINIC | Age: 65
End: 2025-03-10
Payer: COMMERCIAL

## 2025-03-10 VITALS
BODY MASS INDEX: 27.2 KG/M2 | OXYGEN SATURATION: 99 % | DIASTOLIC BLOOD PRESSURE: 70 MMHG | WEIGHT: 190 LBS | SYSTOLIC BLOOD PRESSURE: 109 MMHG | TEMPERATURE: 97.2 F | HEIGHT: 70 IN | HEART RATE: 63 BPM

## 2025-03-10 DIAGNOSIS — E11.69 COMBINED HYPERLIPIDEMIA ASSOCIATED WITH TYPE 2 DIABETES MELLITUS (MULTI): ICD-10-CM

## 2025-03-10 DIAGNOSIS — E11.3299 MILD NONPROLIFERATIVE DIABETIC RETINOPATHY WITHOUT MACULAR EDEMA ASSOCIATED WITH TYPE 2 DIABETES MELLITUS, UNSPECIFIED LATERALITY: ICD-10-CM

## 2025-03-10 DIAGNOSIS — I25.810 CORONARY ARTERY DISEASE INVOLVING CORONARY BYPASS GRAFT OF NATIVE HEART WITHOUT ANGINA PECTORIS: ICD-10-CM

## 2025-03-10 DIAGNOSIS — I15.2 HYPERTENSION ASSOCIATED WITH DIABETES (MULTI): ICD-10-CM

## 2025-03-10 DIAGNOSIS — E78.2 COMBINED HYPERLIPIDEMIA ASSOCIATED WITH TYPE 2 DIABETES MELLITUS (MULTI): ICD-10-CM

## 2025-03-10 DIAGNOSIS — Z79.4 CONTROLLED TYPE 2 DIABETES MELLITUS WITH DIABETIC POLYNEUROPATHY, WITH LONG-TERM CURRENT USE OF INSULIN: ICD-10-CM

## 2025-03-10 DIAGNOSIS — E03.9 HYPOTHYROIDISM, UNSPECIFIED: ICD-10-CM

## 2025-03-10 DIAGNOSIS — E11.42 DIABETIC POLYNEUROPATHY ASSOCIATED WITH TYPE 2 DIABETES MELLITUS (MULTI): ICD-10-CM

## 2025-03-10 DIAGNOSIS — E11.69 TYPE 2 DIABETES MELLITUS WITH OTHER SPECIFIED COMPLICATION: ICD-10-CM

## 2025-03-10 DIAGNOSIS — E11.42 CONTROLLED TYPE 2 DIABETES MELLITUS WITH DIABETIC POLYNEUROPATHY, WITH LONG-TERM CURRENT USE OF INSULIN: ICD-10-CM

## 2025-03-10 DIAGNOSIS — E11.59 HYPERTENSION ASSOCIATED WITH DIABETES (MULTI): ICD-10-CM

## 2025-03-10 DIAGNOSIS — Z00.00 VISIT FOR PREVENTIVE HEALTH EXAMINATION: Primary | ICD-10-CM

## 2025-03-10 DIAGNOSIS — E78.2 MIXED HYPERLIPIDEMIA: ICD-10-CM

## 2025-03-10 DIAGNOSIS — I25.810 ATHEROSCLEROSIS OF CORONARY ARTERY BYPASS GRAFT(S) WITHOUT ANGINA PECTORIS: ICD-10-CM

## 2025-03-10 DIAGNOSIS — N18.31 STAGE 3A CHRONIC KIDNEY DISEASE (MULTI): ICD-10-CM

## 2025-03-10 PROCEDURE — G0444 DEPRESSION SCREEN ANNUAL: HCPCS | Performed by: FAMILY MEDICINE

## 2025-03-10 PROCEDURE — 99497 ADVNCD CARE PLAN 30 MIN: CPT | Performed by: FAMILY MEDICINE

## 2025-03-10 PROCEDURE — 4010F ACE/ARB THERAPY RXD/TAKEN: CPT | Performed by: FAMILY MEDICINE

## 2025-03-10 PROCEDURE — 93000 ELECTROCARDIOGRAM COMPLETE: CPT | Performed by: FAMILY MEDICINE

## 2025-03-10 PROCEDURE — 3074F SYST BP LT 130 MM HG: CPT | Performed by: FAMILY MEDICINE

## 2025-03-10 PROCEDURE — 1036F TOBACCO NON-USER: CPT | Performed by: FAMILY MEDICINE

## 2025-03-10 PROCEDURE — 99214 OFFICE O/P EST MOD 30 MIN: CPT | Performed by: FAMILY MEDICINE

## 2025-03-10 PROCEDURE — G0439 PPPS, SUBSEQ VISIT: HCPCS | Performed by: FAMILY MEDICINE

## 2025-03-10 PROCEDURE — 3078F DIAST BP <80 MM HG: CPT | Performed by: FAMILY MEDICINE

## 2025-03-10 PROCEDURE — 3008F BODY MASS INDEX DOCD: CPT | Performed by: FAMILY MEDICINE

## 2025-03-10 RX ORDER — CLOPIDOGREL BISULFATE 75 MG/1
75 TABLET ORAL DAILY
Qty: 90 TABLET | Refills: 2 | Status: SHIPPED | OUTPATIENT
Start: 2025-03-10

## 2025-03-10 RX ORDER — METOPROLOL SUCCINATE 50 MG/1
50 TABLET, EXTENDED RELEASE ORAL DAILY
Qty: 90 TABLET | Refills: 2 | Status: SHIPPED | OUTPATIENT
Start: 2025-03-10

## 2025-03-10 RX ORDER — SIMVASTATIN 20 MG/1
20 TABLET, FILM COATED ORAL DAILY
Qty: 90 TABLET | Refills: 2 | Status: SHIPPED | OUTPATIENT
Start: 2025-03-10

## 2025-03-10 RX ORDER — INSULIN LISPRO 100 [IU]/ML
INJECTION, SOLUTION INTRAVENOUS; SUBCUTANEOUS
Qty: 3 EACH | Refills: 2 | Status: SHIPPED | OUTPATIENT
Start: 2025-03-10

## 2025-03-10 RX ORDER — INSULIN GLARGINE 300 [IU]/ML
15 INJECTION, SOLUTION SUBCUTANEOUS NIGHTLY
Qty: 4.5 ML | Refills: 2 | Status: SHIPPED | OUTPATIENT
Start: 2025-03-10

## 2025-03-10 RX ORDER — LEVOTHYROXINE SODIUM 50 UG/1
50 TABLET ORAL DAILY
Qty: 90 TABLET | Refills: 2 | Status: SHIPPED | OUTPATIENT
Start: 2025-03-10

## 2025-03-10 RX ORDER — FENOFIBRATE 160 MG/1
80 TABLET ORAL DAILY
Qty: 45 TABLET | Refills: 2 | Status: SHIPPED | OUTPATIENT
Start: 2025-03-10 | End: 2026-03-10

## 2025-03-10 RX ORDER — LISINOPRIL 5 MG/1
5 TABLET ORAL DAILY
Qty: 90 TABLET | Refills: 2 | Status: SHIPPED | OUTPATIENT
Start: 2025-03-10

## 2025-03-10 ASSESSMENT — PATIENT HEALTH QUESTIONNAIRE - PHQ9
SUM OF ALL RESPONSES TO PHQ9 QUESTIONS 1 AND 2: 0
2. FEELING DOWN, DEPRESSED OR HOPELESS: NOT AT ALL
1. LITTLE INTEREST OR PLEASURE IN DOING THINGS: NOT AT ALL

## 2025-03-10 ASSESSMENT — ACTIVITIES OF DAILY LIVING (ADL)
MANAGING_FINANCES: INDEPENDENT
BATHING: INDEPENDENT
TAKING_MEDICATION: INDEPENDENT
GROCERY_SHOPPING: INDEPENDENT
DOING_HOUSEWORK: INDEPENDENT
DRESSING: INDEPENDENT

## 2025-03-10 ASSESSMENT — LIFESTYLE VARIABLES
HOW MANY STANDARD DRINKS CONTAINING ALCOHOL DO YOU HAVE ON A TYPICAL DAY: PATIENT DOES NOT DRINK
HOW OFTEN DO YOU HAVE A DRINK CONTAINING ALCOHOL: NEVER
SKIP TO QUESTIONS 9-10: 1
HOW OFTEN DO YOU HAVE SIX OR MORE DRINKS ON ONE OCCASION: NEVER
AUDIT-C TOTAL SCORE: 0

## 2025-03-10 NOTE — PROGRESS NOTES
Subjective   Patient ID: Charito Rojas is a 64 y.o. male who presents for follow-up on chronic medical condition.    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 (Multi)    Coronary artery disease involving coronary bypass graft of native heart    Relevant Medications    clopidogrel (Plavix) 75 mg tablet    metoprolol succinate XL (Toprol-XL) 50 mg 24 hr tablet    Other Relevant Orders    Transthoracic Echo Complete    ECG 12 lead (Clinic Performed) (Completed)    Diabetic neuropathy (Multi)    Diabetic retinopathy (Multi)    Hypertension associated with diabetes (Multi)    Relevant Medications    lisinopril 5 mg tablet    insulin glargine (Toujeo SoloStar U-300 Insulin) 300 unit/mL (1.5 mL) pen    Stage 3a chronic kidney disease (Multi)    Visit for preventive health examination - Primary     Other Visit Diagnoses       Atherosclerosis of coronary artery bypass graft(s) without angina pectoris        Relevant Medications    clopidogrel (Plavix) 75 mg tablet    metoprolol succinate XL (Toprol-XL) 50 mg 24 hr tablet    Type 2 diabetes mellitus with other specified complication        Relevant Medications    fenofibrate (Triglide) 160 mg tablet    simvastatin (Zocor) 20 mg tablet    Mixed hyperlipidemia        Relevant Medications    fenofibrate (Triglide) 160 mg tablet    simvastatin (Zocor) 20 mg tablet    Hypothyroidism, unspecified        Relevant Medications    levothyroxine (Synthroid, Levoxyl) 50 mcg tablet    Controlled type 2 diabetes mellitus with diabetic polyneuropathy, with long-term current use of insulin        Relevant Medications    insulin glargine (Toujeo SoloStar U-300 Insulin) 300 unit/mL (1.5 mL) pen    insulin lispro (HumaLOG KwikPen Insulin) 100 unit/mL pen          Time spent around 10 minutes obtaining and discussing depression screening using PHQ 9 questions with results documented in the chart      Prevnar  20  uptodate  Continue follow-up with podiatry  Continue to follow-up with ophthalmology  Saw 2wks ago      Received flu vaccine    Diet exercise  cologuard negative June 2020 and had a colonoscopy 4 years ago  Cologuard negative in July 2023  Following with podiatry  Received Pneumovax  Discussed about diet exercise  Following up with ophthalmology with diabetic retinopathy every 6 months  Consider gabapentin for diabetic neuropathy patient stopped  Follow-up in 6 months         HPI      64-year-old male here for Medicare wellness visit and follow-up on chronic medical conditions    Workup on previous visit abnormal lab work showed hematuria microscopic with elevated liver function and mild anemia  History of nephrolithiasis  FOBT x 3 negative  Cholelithiasis we will still recommend patient to see urology information provided for cystoscopy  chronic kidney disease stage III  seeing nephrology    Received flu vaccine  HbA1c  6.9 ->6.4 -> 6.7->6.0->6.3-> 5.8    Discussed about discussed about diet and exercise . Healthy diet - exercise 30 min / day for 5 days a week. Risk and benefit of medications explained to patient.  Continue current medication  Was seen by urology for microscopic hematuria per patient it was secondary to nephrolithiasis per urology    No hypoglycemia    Hypertension.   Hyperlipidemia.  Uncontrolled diabetes with peripheral neuropathy fairly controlled without medication diabetic retinopathy  Hypothyroidism  Obesity.   Coronary artery disease, three-vessel bypass surgery February 2011 without active symptoms. Maintained on appropriate medications and dual antiplatelet agents  Currently not following up with cardiology  Vitamin D deficiency      bilateral feet tingling numbness pins and needles secondary to diabetic neuropathy  No hypoglycemia no new signs and symptoms    Following with podiatry ophthalmology    Discussed about getting shingles vaccine  Walking 5 miles almost every day     Advance  directives:. Advanced Care Planning discussed and documented advance care plan or surrogate decision maker documented in the medical record.   A Conversation about Advance directives of at least 16 minutes has occurred. Actual time spent: I spent >16 minutes discussing Advance Care Planning including the explanation and discussion of advance directives. If patient does not have current up to date documents, examples and information provided on how to create both Living Will and Power of . Patient was encouraged to work on completing these documents.  Conversation with: The conversation with the patient and/or participants was voluntary.  Documents discussed and/or completed: Living Will was discussed with participants. Healthcare POA was discussed with participants. Patient educated on how to obtain Living Will and Power of . Discussed patient end of life choices.   Patient does not have a living will or healthcare power of .   Patient is full code.      No Known Allergies      Current Outpatient Medications   Medication Sig Dispense Refill    aspirin 81 mg EC tablet 1 tab(s), ORAL, DAILY, 90 tab(s), Tablet EC      ergocalciferol (Vitamin D-2) 1.25 MG (68626 UT) capsule Take 1 capsule (50,000 Units) by mouth once a week.      clopidogrel (Plavix) 75 mg tablet Take 1 tablet (75 mg) by mouth once daily. 90 tablet 2    fenofibrate (Triglide) 160 mg tablet Take 0.5 tablets (80 mg) by mouth once daily. 45 tablet 2    insulin glargine (Toujeo SoloStar U-300 Insulin) 300 unit/mL (1.5 mL) pen Inject 15 Units under the skin once daily at bedtime. Take as directed per insulin instructions. 4.5 mL 2    insulin lispro (HumaLOG KwikPen Insulin) 100 unit/mL pen INJECT 14 UNITS SUBQUTANIOUSLY 3 TIMES DAILY 3 each 2    levothyroxine (Synthroid, Levoxyl) 50 mcg tablet Take 1 tablet (50 mcg) by mouth once daily. Take on an empty stomach at the same time each day, either 30 to 60 minutes prior to breakfast 90  "tablet 2    lisinopril 5 mg tablet Take 1 tablet (5 mg) by mouth once daily. 90 tablet 2    metoprolol succinate XL (Toprol-XL) 50 mg 24 hr tablet Take 1 tablet (50 mg) by mouth once daily. Do not crush or chew. 90 tablet 2    simvastatin (Zocor) 20 mg tablet Take 1 tablet (20 mg) by mouth once daily. 90 tablet 2     No current facility-administered medications for this visit.       Objective   Visit Vitals  /70 (BP Location: Left arm, Patient Position: Sitting)   Pulse 63   Temp 36.2 °C (97.2 °F)   Ht 1.778 m (5' 10\")   Wt 86.2 kg (190 lb)   SpO2 99%   BMI 27.26 kg/m²   Smoking Status Never   BSA 2.06 m²     B/l le foot numbness chronic   Pilse intact  Left toes deformity  Skin normal  Constitutional:       General: He is not in acute distress.     Appearance: Normal appearance.   HENT:      Head: Normocephalic and atraumatic.      Nose: Nose normal.   Eyes:      Extraocular Movements: Extraocular movements intact.      Conjunctiva/sclera: Conjunctivae normal.   Cardiovascular:      Rate and Rhythm: Normal rate and regular rhythm.   Pulmonary:      Effort: Pulmonary effort is normal.      Breath sounds: Normal breath sounds.   Skin:     General: Skin is warm.   Neurological:      Mental Status: He is alert and oriented to person, place, and time.   Psychiatric:         Mood and Affect: Mood normal.         Behavior: Behavior normal.   Immunization History   Administered Date(s) Administered    Flu vaccine (IIV4), preservative free *Check age/dose* 09/15/2015, 09/12/2016, 09/11/2018, 10/20/2018, 09/18/2019, 09/01/2021, 09/26/2022, 10/10/2023    Flu vaccine, quadrivalent, no egg protein, age 6 month or greater (FLUCELVAX) 09/07/2017    Flu vaccine, trivalent, preservative free, age 6 months and greater (Fluarix/Fluzone/Flulaval) 09/01/2014, 10/02/2024    Influenza, seasonal, injectable 10/02/2024    Pneumococcal conjugate vaccine, 20-valent (PREVNAR 20) 01/06/2023    Pneumococcal polysaccharide vaccine, " 23-valent, age 2 years and older (PNEUMOVAX 23) 10/10/2017    Zoster vaccine, recombinant, adult (SHINGRIX) 05/18/2020       Review of Systems    Lab on 11/12/2024   Component Date Value Ref Range Status    WBC 11/12/2024 5.3  4.4 - 11.3 x10*3/uL Final    nRBC 11/12/2024 0.0  0.0 - 0.0 /100 WBCs Final    RBC 11/12/2024 4.50  4.50 - 5.90 x10*6/uL Final    Hemoglobin 11/12/2024 12.4 (L)  13.5 - 17.5 g/dL Final    Hematocrit 11/12/2024 39.0 (L)  41.0 - 52.0 % Final    MCV 11/12/2024 87  80 - 100 fL Final    MCH 11/12/2024 27.6  26.0 - 34.0 pg Final    MCHC 11/12/2024 31.8 (L)  32.0 - 36.0 g/dL Final    RDW 11/12/2024 13.8  11.5 - 14.5 % Final    Platelets 11/12/2024 233  150 - 450 x10*3/uL Final    Albumin 11/12/2024 4.2  3.4 - 5.0 g/dL Final    Bilirubin, Total 11/12/2024 0.4  0.0 - 1.2 mg/dL Final    Bilirubin, Direct 11/12/2024 0.1  0.0 - 0.3 mg/dL Final    Alkaline Phosphatase 11/12/2024 38  33 - 136 U/L Final    ALT 11/12/2024 13  10 - 52 U/L Final    AST 11/12/2024 16  9 - 39 U/L Final    Total Protein 11/12/2024 7.1  6.4 - 8.2 g/dL Final    Cholesterol 11/12/2024 104  0 - 199 mg/dL Final    HDL-Cholesterol 11/12/2024 36.5  mg/dL Final    Cholesterol/HDL Ratio 11/12/2024 2.8   Final    LDL Calculated 11/12/2024 54  <=99 mg/dL Final    VLDL 11/12/2024 13  0 - 40 mg/dL Final    Triglycerides 11/12/2024 67  0 - 149 mg/dL Final    Non HDL Cholesterol 11/12/2024 68  0 - 149 mg/dL Final    Thyroid Stimulating Hormone 11/12/2024 1.43  0.44 - 3.98 mIU/L Final   Office Visit on 11/11/2024   Component Date Value Ref Range Status    POC HEMOGLOBIN A1c 11/11/2024 5.8  4.2 - 6.5 % Final       Radiology: Reviewed imaging in powerchart.  No results found.    No family history on file.  Social History     Socioeconomic History    Marital status: Unknown   Tobacco Use    Smoking status: Never    Smokeless tobacco: Never   Substance and Sexual Activity    Alcohol use: Never    Drug use: Never     Social Drivers of Health      Financial Resource Strain: High Risk (10/9/2024)    Received from [facility] SDOH Screening     In the past year, have you been unable to get any of the following when you really needed them? choose all that apply.: Clothing     In the past year, have you been unable to get any of the following when you really needed them? choose all that apply.: Internet     Past Medical History:   Diagnosis Date    Atherosclerosis of coronary artery bypass graft(s) without angina pectoris 06/16/2018    Coronary artery disease involving coronary bypass graft of native heart    Other specified postprocedural states 07/09/2017    History of colonoscopy    Personal history of other diseases of the circulatory system 01/16/2018    History of cardiac disorder    Radiculopathy, lumbar region 02/11/2016    Chronic radicular low back pain     Past Surgical History:   Procedure Laterality Date    MOUTH SURGERY  01/08/2015    Oral Surgery Tooth Extraction    OTHER SURGICAL HISTORY  02/20/2019    Cataract surgery    OTHER SURGICAL HISTORY  09/18/2019    Eye surgery       Charting was completed using voice recognition technology and may include unintended errors.       Physical Exam

## 2025-07-11 ENCOUNTER — APPOINTMENT (OUTPATIENT)
Dept: PRIMARY CARE | Facility: CLINIC | Age: 65
End: 2025-07-11
Payer: COMMERCIAL

## 2025-07-11 VITALS
BODY MASS INDEX: 28.06 KG/M2 | SYSTOLIC BLOOD PRESSURE: 117 MMHG | DIASTOLIC BLOOD PRESSURE: 78 MMHG | HEIGHT: 70 IN | TEMPERATURE: 97.2 F | HEART RATE: 65 BPM | WEIGHT: 196 LBS | OXYGEN SATURATION: 97 %

## 2025-07-11 DIAGNOSIS — N18.31 STAGE 3A CHRONIC KIDNEY DISEASE (MULTI): ICD-10-CM

## 2025-07-11 DIAGNOSIS — I15.2 HYPERTENSION ASSOCIATED WITH DIABETES: ICD-10-CM

## 2025-07-11 DIAGNOSIS — E78.2 COMBINED HYPERLIPIDEMIA ASSOCIATED WITH TYPE 2 DIABETES MELLITUS: Primary | ICD-10-CM

## 2025-07-11 DIAGNOSIS — E11.59 HYPERTENSION ASSOCIATED WITH DIABETES: ICD-10-CM

## 2025-07-11 DIAGNOSIS — E11.3299 MILD NONPROLIFERATIVE DIABETIC RETINOPATHY WITHOUT MACULAR EDEMA ASSOCIATED WITH TYPE 2 DIABETES MELLITUS, UNSPECIFIED LATERALITY: ICD-10-CM

## 2025-07-11 DIAGNOSIS — E11.42 DIABETIC POLYNEUROPATHY ASSOCIATED WITH TYPE 2 DIABETES MELLITUS: ICD-10-CM

## 2025-07-11 DIAGNOSIS — E11.69 COMBINED HYPERLIPIDEMIA ASSOCIATED WITH TYPE 2 DIABETES MELLITUS: Primary | ICD-10-CM

## 2025-07-11 LAB — POC HEMOGLOBIN A1C: 5.9 % (ref 4.2–6.5)

## 2025-07-11 PROCEDURE — 3008F BODY MASS INDEX DOCD: CPT | Performed by: FAMILY MEDICINE

## 2025-07-11 PROCEDURE — 99214 OFFICE O/P EST MOD 30 MIN: CPT | Performed by: FAMILY MEDICINE

## 2025-07-11 PROCEDURE — 3044F HG A1C LEVEL LT 7.0%: CPT | Performed by: FAMILY MEDICINE

## 2025-07-11 PROCEDURE — 83036 HEMOGLOBIN GLYCOSYLATED A1C: CPT | Performed by: FAMILY MEDICINE

## 2025-07-11 PROCEDURE — 1036F TOBACCO NON-USER: CPT | Performed by: FAMILY MEDICINE

## 2025-07-11 PROCEDURE — 4010F ACE/ARB THERAPY RXD/TAKEN: CPT | Performed by: FAMILY MEDICINE

## 2025-07-11 PROCEDURE — 3078F DIAST BP <80 MM HG: CPT | Performed by: FAMILY MEDICINE

## 2025-07-11 PROCEDURE — 3074F SYST BP LT 130 MM HG: CPT | Performed by: FAMILY MEDICINE

## 2025-07-11 NOTE — PROGRESS NOTES
Subjective   Patient ID: Charito Rojas is a 64 y.o. male who presents for follow-up on chronic medical condition.    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 - Primary    Relevant Orders    CBC    Comprehensive Metabolic Panel    Diabetic neuropathy (Multi)    Relevant Orders    POCT glycosylated hemoglobin (Hb A1C) manually resulted    Diabetic retinopathy (Multi)    Relevant Orders    POCT glycosylated hemoglobin (Hb A1C) manually resulted    Hypertension associated with diabetes    Relevant Orders    POCT glycosylated hemoglobin (Hb A1C) manually resulted    CBC    Comprehensive Metabolic Panel    Stage 3a chronic kidney disease (Multi)    Relevant Orders    CBC    Comprehensive Metabolic Panel       Follow up in december  Patient agreed    Prevnar 20  uptodate  Continue follow-up with podiatry  Continue to follow-up with ophthalmology        Received flu vaccine    Diet exercise  cologuard negative June 2020 and had a colonoscopy 4 years ago  Cologuard negative in July 2023  Following with podiatry  Received Pneumovax  Discussed about diet exercise  Following up with ophthalmology with diabetic retinopathy every 6 months  Consider gabapentin for diabetic neuropathy patient stopped  Follow-up in 6 months         HPI      64-year-old male here for follow-up on chronic medical conditions    No new s/s      Workup on previous visit abnormal lab work showed hematuria microscopic with elevated liver function and mild anemia  History of nephrolithiasis  FOBT x 3 negative  Cholelithiasis we will still recommend patient to see urology information provided for cystoscopy  chronic kidney disease stage III  seeing nephrology    Received flu vaccine  HbA1c  6.9 ->6.4 -> 6.7->6.0->6.3-> 5.8>5.9    Discussed about discussed about diet and exercise . Healthy diet - exercise 30 min / day for 5 days a week. Risk and benefit of medications  explained to patient.  Continue current medication  Was seen by urology for microscopic hematuria per patient it was secondary to nephrolithiasis per urology    No hypoglycemia    Hypertension.   Hyperlipidemia.  Uncontrolled diabetes with peripheral neuropathy fairly controlled without medication diabetic retinopathy  Hypothyroidism  Obesity.   Coronary artery disease, three-vessel bypass surgery February 2011 without active symptoms. Maintained on appropriate medications and dual antiplatelet agents  Currently not following up with cardiology  Vitamin D deficiency      bilateral feet tingling numbness pins and needles secondary to diabetic neuropathy  No hypoglycemia no new signs and symptoms    Following with podiatry ophthalmology    Discussed about getting shingles vaccine  Walking 5 miles almost every day       Previous visit    Advance directives:. Advanced Care Planning discussed and documented advance care plan or surrogate decision maker documented in the medical record.   A Conversation about Advance directives of at least 16 minutes has occurred. Actual time spent: I spent >16 minutes discussing Advance Care Planning including the explanation and discussion of advance directives. If patient does not have current up to date documents, examples and information provided on how to create both Living Will and Power of . Patient was encouraged to work on completing these documents.  Conversation with: The conversation with the patient and/or participants was voluntary.  Documents discussed and/or completed: Living Will was discussed with participants. Healthcare POA was discussed with participants. Patient educated on how to obtain Living Will and Power of . Discussed patient end of life choices.   Patient does not have a living will or healthcare power of .   Patient is full code.      No Known Allergies      Current Outpatient Medications   Medication Sig Dispense Refill    aspirin 81  "mg EC tablet 1 tab(s), ORAL, DAILY, 90 tab(s), Tablet EC      clopidogrel (Plavix) 75 mg tablet Take 1 tablet (75 mg) by mouth once daily. 90 tablet 2    fenofibrate (Triglide) 160 mg tablet Take 0.5 tablets (80 mg) by mouth once daily. 45 tablet 2    insulin glargine (Toujeo SoloStar U-300 Insulin) 300 unit/mL (1.5 mL) pen Inject 15 Units under the skin once daily at bedtime. Take as directed per insulin instructions. 4.5 mL 2    insulin lispro (HumaLOG KwikPen Insulin) 100 unit/mL pen INJECT 14 UNITS SUBQUTANIOUSLY 3 TIMES DAILY 3 each 2    levothyroxine (Synthroid, Levoxyl) 50 mcg tablet Take 1 tablet (50 mcg) by mouth once daily. Take on an empty stomach at the same time each day, either 30 to 60 minutes prior to breakfast 90 tablet 2    lisinopril 5 mg tablet Take 1 tablet (5 mg) by mouth once daily. 90 tablet 2    metoprolol succinate XL (Toprol-XL) 50 mg 24 hr tablet Take 1 tablet (50 mg) by mouth once daily. Do not crush or chew. 90 tablet 2    simvastatin (Zocor) 20 mg tablet Take 1 tablet (20 mg) by mouth once daily. 90 tablet 2     No current facility-administered medications for this visit.       Objective   Visit Vitals  /78 (BP Location: Left arm, Patient Position: Sitting)   Pulse 65   Temp 36.2 °C (97.2 °F)   Ht 1.778 m (5' 10\")   Wt 88.9 kg (196 lb)   SpO2 97%   BMI 28.12 kg/m²   Smoking Status Never   BSA 2.1 m²     B/l le foot numbness chronic   Pilse intact  Left toes deformity  Skin normal  Constitutional:       General: He is not in acute distress.     Appearance: Normal appearance.   HENT:      Head: Normocephalic and atraumatic.      Nose: Nose normal.   Eyes:      Extraocular Movements: Extraocular movements intact.      Conjunctiva/sclera: Conjunctivae normal.   Cardiovascular:      Rate and Rhythm: Normal rate and regular rhythm.   Pulmonary:      Effort: Pulmonary effort is normal.      Breath sounds: Normal breath sounds.   Skin:     General: Skin is warm.   Neurological:      " Mental Status: He is alert and oriented to person, place, and time.   Psychiatric:         Mood and Affect: Mood normal.         Behavior: Behavior normal.   Immunization History   Administered Date(s) Administered    Flu vaccine (IIV4), preservative free *Check age/dose* 09/15/2015, 09/12/2016, 09/11/2018, 10/20/2018, 09/18/2019, 09/01/2021, 09/26/2022, 10/10/2023    Flu vaccine, quadrivalent, no egg protein, age 6 month or greater (FLUCELVAX) 09/07/2017    Flu vaccine, trivalent, preservative free, age 6 months and greater (Fluarix/Fluzone/Flulaval) 09/01/2014, 10/02/2024    Influenza, seasonal, injectable 10/02/2024    Pneumococcal conjugate vaccine, 20-valent (PREVNAR 20) 01/06/2023    Pneumococcal polysaccharide vaccine, 23-valent, age 2 years and older (PNEUMOVAX 23) 10/10/2017    Zoster vaccine, recombinant, adult (SHINGRIX) 05/18/2020       Review of Systems    No visits with results within 4 Month(s) from this visit.   Latest known visit with results is:   Lab on 11/12/2024   Component Date Value Ref Range Status    WBC 11/12/2024 5.3  4.4 - 11.3 x10*3/uL Final    nRBC 11/12/2024 0.0  0.0 - 0.0 /100 WBCs Final    RBC 11/12/2024 4.50  4.50 - 5.90 x10*6/uL Final    Hemoglobin 11/12/2024 12.4 (L)  13.5 - 17.5 g/dL Final    Hematocrit 11/12/2024 39.0 (L)  41.0 - 52.0 % Final    MCV 11/12/2024 87  80 - 100 fL Final    MCH 11/12/2024 27.6  26.0 - 34.0 pg Final    MCHC 11/12/2024 31.8 (L)  32.0 - 36.0 g/dL Final    RDW 11/12/2024 13.8  11.5 - 14.5 % Final    Platelets 11/12/2024 233  150 - 450 x10*3/uL Final    Albumin 11/12/2024 4.2  3.4 - 5.0 g/dL Final    Bilirubin, Total 11/12/2024 0.4  0.0 - 1.2 mg/dL Final    Bilirubin, Direct 11/12/2024 0.1  0.0 - 0.3 mg/dL Final    Alkaline Phosphatase 11/12/2024 38  33 - 136 U/L Final    ALT 11/12/2024 13  10 - 52 U/L Final    AST 11/12/2024 16  9 - 39 U/L Final    Total Protein 11/12/2024 7.1  6.4 - 8.2 g/dL Final    Cholesterol 11/12/2024 104  0 - 199 mg/dL Final     HDL-Cholesterol 11/12/2024 36.5  mg/dL Final    Cholesterol/HDL Ratio 11/12/2024 2.8   Final    LDL Calculated 11/12/2024 54  <=99 mg/dL Final    VLDL 11/12/2024 13  0 - 40 mg/dL Final    Triglycerides 11/12/2024 67  0 - 149 mg/dL Final    Non HDL Cholesterol 11/12/2024 68  0 - 149 mg/dL Final    Thyroid Stimulating Hormone 11/12/2024 1.43  0.44 - 3.98 mIU/L Final       Radiology: Reviewed imaging in powerchart.  No results found.    No family history on file.  Social History     Socioeconomic History    Marital status: Unknown   Tobacco Use    Smoking status: Never    Smokeless tobacco: Never   Substance and Sexual Activity    Alcohol use: Never    Drug use: Never     Social Drivers of Health     Financial Resource Strain: High Risk (10/9/2024)    Received from [facility] SDOH Screening     In the past year, have you been unable to get any of the following when you really needed them? choose all that apply.: Clothing     In the past year, have you been unable to get any of the following when you really needed them? choose all that apply.: Internet     Past Medical History:   Diagnosis Date    Atherosclerosis of coronary artery bypass graft(s) without angina pectoris 06/16/2018    Coronary artery disease involving coronary bypass graft of native heart    Other specified postprocedural states 07/09/2017    History of colonoscopy    Personal history of other diseases of the circulatory system 01/16/2018    History of cardiac disorder    Radiculopathy, lumbar region 02/11/2016    Chronic radicular low back pain     Past Surgical History:   Procedure Laterality Date    MOUTH SURGERY  01/08/2015    Oral Surgery Tooth Extraction    OTHER SURGICAL HISTORY  02/20/2019    Cataract surgery    OTHER SURGICAL HISTORY  09/18/2019    Eye surgery       Charting was completed using voice recognition technology and may include unintended errors.       Physical Exam

## 2025-07-12 LAB
ALBUMIN SERPL-MCNC: 4.3 G/DL (ref 3.6–5.1)
ALP SERPL-CCNC: 36 U/L (ref 35–144)
ALT SERPL-CCNC: 12 U/L (ref 9–46)
ANION GAP SERPL CALCULATED.4IONS-SCNC: 8 MMOL/L (CALC) (ref 7–17)
AST SERPL-CCNC: 16 U/L (ref 10–35)
BILIRUB SERPL-MCNC: 0.4 MG/DL (ref 0.2–1.2)
BUN SERPL-MCNC: 21 MG/DL (ref 7–25)
CALCIUM SERPL-MCNC: 9.6 MG/DL (ref 8.6–10.3)
CHLORIDE SERPL-SCNC: 107 MMOL/L (ref 98–110)
CO2 SERPL-SCNC: 26 MMOL/L (ref 20–32)
CREAT SERPL-MCNC: 1.43 MG/DL (ref 0.7–1.35)
EGFRCR SERPLBLD CKD-EPI 2021: 55 ML/MIN/1.73M2
ERYTHROCYTE [DISTWIDTH] IN BLOOD BY AUTOMATED COUNT: 13 % (ref 11–15)
GLUCOSE SERPL-MCNC: 182 MG/DL (ref 65–99)
HCT VFR BLD AUTO: 38.8 % (ref 38.5–50)
HGB BLD-MCNC: 12.4 G/DL (ref 13.2–17.1)
MCH RBC QN AUTO: 28.2 PG (ref 27–33)
MCHC RBC AUTO-ENTMCNC: 32 G/DL (ref 32–36)
MCV RBC AUTO: 88.2 FL (ref 80–100)
PLATELET # BLD AUTO: 190 THOUSAND/UL (ref 140–400)
PMV BLD REES-ECKER: 12.4 FL (ref 7.5–12.5)
POTASSIUM SERPL-SCNC: 4.6 MMOL/L (ref 3.5–5.3)
PROT SERPL-MCNC: 7.1 G/DL (ref 6.1–8.1)
RBC # BLD AUTO: 4.4 MILLION/UL (ref 4.2–5.8)
SODIUM SERPL-SCNC: 141 MMOL/L (ref 135–146)
WBC # BLD AUTO: 4.8 THOUSAND/UL (ref 3.8–10.8)

## 2025-12-12 ENCOUNTER — APPOINTMENT (OUTPATIENT)
Dept: PRIMARY CARE | Facility: CLINIC | Age: 65
End: 2025-12-12
Payer: COMMERCIAL